# Patient Record
Sex: MALE | Race: WHITE | NOT HISPANIC OR LATINO | URBAN - METROPOLITAN AREA
[De-identification: names, ages, dates, MRNs, and addresses within clinical notes are randomized per-mention and may not be internally consistent; named-entity substitution may affect disease eponyms.]

---

## 2022-10-25 ENCOUNTER — TELEPHONE (OUTPATIENT)
Dept: INTERNAL MEDICINE | Facility: CLINIC | Age: 82
End: 2022-10-25
Payer: MEDICARE

## 2022-10-25 NOTE — TELEPHONE ENCOUNTER
----- Message from Rosey Rosales sent at 10/25/2022  4:37 PM CDT -----  Contact: [phone]  Patient is returning a phone call.  Who left a message for the patient: Gualberto  Does patient know what this is regarding:  APPT  Would you like a call back, or a response through your [facility] portal?:   CALL  Comments:

## 2022-11-15 ENCOUNTER — OFFICE VISIT (OUTPATIENT)
Dept: INTERNAL MEDICINE | Facility: CLINIC | Age: 82
End: 2022-11-15
Payer: MEDICARE

## 2022-11-15 VITALS
BODY MASS INDEX: 29.54 KG/M2 | WEIGHT: 211 LBS | HEART RATE: 53 BPM | SYSTOLIC BLOOD PRESSURE: 100 MMHG | OXYGEN SATURATION: 95 % | HEIGHT: 71 IN | DIASTOLIC BLOOD PRESSURE: 54 MMHG

## 2022-11-15 DIAGNOSIS — Z00.00 ROUTINE PHYSICAL EXAMINATION: ICD-10-CM

## 2022-11-15 DIAGNOSIS — R35.0 BENIGN PROSTATIC HYPERPLASIA WITH URINARY FREQUENCY: ICD-10-CM

## 2022-11-15 DIAGNOSIS — F32.A DEPRESSION, UNSPECIFIED DEPRESSION TYPE: ICD-10-CM

## 2022-11-15 DIAGNOSIS — R44.0 AUDITORY HALLUCINATIONS: ICD-10-CM

## 2022-11-15 DIAGNOSIS — N40.1 BENIGN PROSTATIC HYPERPLASIA WITH URINARY FREQUENCY: ICD-10-CM

## 2022-11-15 DIAGNOSIS — F41.1 GAD (GENERALIZED ANXIETY DISORDER): ICD-10-CM

## 2022-11-15 DIAGNOSIS — F29 PSYCHOSIS, UNSPECIFIED PSYCHOSIS TYPE: ICD-10-CM

## 2022-11-15 DIAGNOSIS — I10 PRIMARY HYPERTENSION: Primary | ICD-10-CM

## 2022-11-15 PROCEDURE — 99999 PR PBB SHADOW E&M-EST. PATIENT-LVL III: ICD-10-PCS | Mod: PBBFAC,,, | Performed by: INTERNAL MEDICINE

## 2022-11-15 PROCEDURE — 99204 PR OFFICE/OUTPT VISIT, NEW, LEVL IV, 45-59 MIN: ICD-10-PCS | Mod: S$PBB,,, | Performed by: INTERNAL MEDICINE

## 2022-11-15 PROCEDURE — 99999 PR PBB SHADOW E&M-EST. PATIENT-LVL III: CPT | Mod: PBBFAC,,, | Performed by: INTERNAL MEDICINE

## 2022-11-15 PROCEDURE — 99213 OFFICE O/P EST LOW 20 MIN: CPT | Mod: PBBFAC | Performed by: INTERNAL MEDICINE

## 2022-11-15 PROCEDURE — 99204 OFFICE O/P NEW MOD 45 MIN: CPT | Mod: S$PBB,,, | Performed by: INTERNAL MEDICINE

## 2022-11-15 RX ORDER — TAMSULOSIN HYDROCHLORIDE 0.4 MG/1
1 CAPSULE ORAL DAILY
COMMUNITY
Start: 2022-08-05 | End: 2023-02-07 | Stop reason: SDUPTHER

## 2022-11-15 RX ORDER — LOSARTAN POTASSIUM 25 MG/1
1 TABLET ORAL DAILY
COMMUNITY
Start: 2022-05-25 | End: 2023-11-14 | Stop reason: SDUPTHER

## 2022-11-15 RX ORDER — ACETAMINOPHEN, DEXTROMETHORPHAN HBR, DOXYLAMINE SUCCINATE, PHENYLEPHRINE HCL 650; 20; 12.5; 1 MG/30ML; MG/30ML; MG/30ML; MG/30ML
SOLUTION ORAL
COMMUNITY

## 2022-11-15 RX ORDER — MIRTAZAPINE 15 MG/1
30 TABLET, FILM COATED ORAL NIGHTLY
COMMUNITY
Start: 2022-08-31 | End: 2023-06-20 | Stop reason: SDUPTHER

## 2022-11-15 RX ORDER — EPINEPHRINE 0.3 MG/.3ML
INJECTION SUBCUTANEOUS
COMMUNITY
Start: 2022-06-15 | End: 2023-12-29 | Stop reason: SDUPTHER

## 2022-11-15 RX ORDER — METOPROLOL TARTRATE 50 MG/1
50 TABLET ORAL 2 TIMES DAILY
COMMUNITY
Start: 2022-08-10 | End: 2022-12-01 | Stop reason: SDUPTHER

## 2022-11-15 RX ORDER — ATORVASTATIN CALCIUM 20 MG/1
1 TABLET, FILM COATED ORAL DAILY
COMMUNITY
Start: 2022-01-17 | End: 2022-12-01 | Stop reason: SDUPTHER

## 2022-11-15 NOTE — PROGRESS NOTES
Subjective:       Patient ID: Yao Burroughs is a 82 y.o. male.    Chief Complaint: Establish Care    New pt to me to est care. Seen with his daughter. His son-in-law is a primary care provider. Moved from [place] a few weeks ago. He primarily needs to see Psychiatry for acute worsening earlier this year. He was hospitalized and saw several providers both in patient and outpatient. First he started with UTI and then had worsening anxiety and depression. He has heard noises that can turn in to auditory hallucinations. He was hearing neighbors talking about him. Was evaluated with brain scan and neuro and psych testing. [place] to possibly have mild MCI but not parkinsonism and not dementia . Many of the notes and tests from [place] are present in Care Everywhere. They want to establish with a psychiatrist and we will help. He gets very anxious about new things and does not want to start with an [facility] or Psychologist. I explained we can certainly work to get him plugged in.     He has stable HTN, HLP, BPH and med reviewed.   We may need another visit in coming weeks to further eval other issues but he wants to start with psychiatry first.   He is living in Assisted Living. He still drives locally and short distances but says he gets anxious.     Review of Systems   Respiratory:  Negative for cough and shortness of breath.    Cardiovascular:  Negative for chest pain.   Gastrointestinal:  Negative for abdominal pain.   Psychiatric/Behavioral:  Positive for agitation and dysphoric mood. The patient is nervous/anxious.          Past Medical History:   Diagnosis Date    Anxiety disorder, unspecified     Auditory hallucinations     Depression     Psychosis      No past surgical history on file.   Patient Active Problem List   Diagnosis    Primary hypertension    Benign prostatic hyperplasia with urinary frequency    JYOTI (generalized anxiety disorder)    Depression    Psychosis    Auditory hallucinations        Objective:      Physical  "Exam  Constitutional:       Appearance: Normal appearance.   Cardiovascular:      Rate and Rhythm: Normal rate and regular rhythm.      Pulses: Normal pulses.      Heart sounds: Normal heart sounds.   Pulmonary:      Effort: No respiratory distress.   Chest:      Chest wall: No tenderness.   Abdominal:      Tenderness: There is no abdominal tenderness.   Neurological:      General: No focal deficit present.      Mental Status: He is alert and oriented to person, place, and time.   Psychiatric:         Mood and Affect: Mood normal.       Assessment:       Problem List Items Addressed This Visit          Psychiatric    JYOTI (generalized anxiety disorder)    Relevant Orders    Ambulatory referral/consult to Psychiatry    Depression    Relevant Orders    Ambulatory referral/consult to Psychiatry    Psychosis    Relevant Orders    Ambulatory referral/consult to Psychiatry       Cardiac/Vascular    Primary hypertension - Primary       Renal/    Benign prostatic hyperplasia with urinary frequency       Other    Auditory hallucinations     Other Visit Diagnoses       Routine physical examination                Plan:         Yao was seen today for establish care.    Diagnoses and all orders for this visit:    Primary hypertension    JYOTI (generalized anxiety disorder)  -     Ambulatory referral/consult to Psychiatry; Future    Depression, unspecified depression type  -     Ambulatory referral/consult to Psychiatry; Future    Psychosis, unspecified psychosis type  -     Ambulatory referral/consult to Psychiatry; Future    Auditory hallucinations    Benign prostatic hyperplasia with urinary frequency    Routine physical examination         Records reviewed regarding Neuro notes and MRI scan. I intend to review more as time permits.           Portions of this note may have been created with voice recognition software. Occasional "wrong-word" or "sound-a-like" substitutions may have occurred due to the inherent limitations of " voice recognition software. Please, read the note carefully and recognize, using context, where substitutions have occurred.

## 2022-11-19 ENCOUNTER — PATIENT MESSAGE (OUTPATIENT)
Dept: INTERNAL MEDICINE | Facility: CLINIC | Age: 82
End: 2022-11-19
Payer: MEDICARE

## 2022-11-21 NOTE — TELEPHONE ENCOUNTER
Please let pt or his daughter know we are trying to help. Usually Psychiatry requires pt/fam to schedule. He just moved here to be near his family. Son -in-law is an old friend of mine, physician here in town. Not sure if we can reach the Psych deprt they can help but we can try. He had bad experience with NP in psych in [place] so that is why he is trying to get right in to a physician.

## 2022-11-22 NOTE — TELEPHONE ENCOUNTER
Patient has apt scheduled. Called patient to ensure he was aware. He reports his daughter made the apt 15 minutes ago. No additional concerns or questions. Pt appreciative of phone call.

## 2022-11-28 ENCOUNTER — TELEPHONE (OUTPATIENT)
Dept: INTERNAL MEDICINE | Facility: CLINIC | Age: 82
End: 2022-11-28
Payer: MEDICARE

## 2022-11-28 NOTE — TELEPHONE ENCOUNTER
----- Message from Nel Padilla sent at 11/28/2022 12:59 PM CST -----  Contact: pt [phone]  Requesting an RX refill or new RX.  Is this a refill or new RX: Refill  RX name and strength: metoprolol tartrate (LOPRESSOR) 50 MG tablet  Is this a 30 day or 90 day RX: 90 day with refills  Patient advised refills can take 72 hours and [facility] can be used for refills?:  yes  Pharmacy name and phone #:   [id number] - [place], [place] - [address] AT [facility] [address]  [facility]  [facility] [address]  Phone: [phone] Fax: [phone]    Comments:     Thank You

## 2022-11-30 ENCOUNTER — PATIENT MESSAGE (OUTPATIENT)
Dept: INTERNAL MEDICINE | Facility: CLINIC | Age: 82
End: 2022-11-30
Payer: MEDICARE

## 2022-11-30 DIAGNOSIS — E78.5 HYPERLIPIDEMIA, UNSPECIFIED HYPERLIPIDEMIA TYPE: Primary | ICD-10-CM

## 2022-12-01 RX ORDER — METOPROLOL TARTRATE 50 MG/1
50 TABLET ORAL 2 TIMES DAILY
Qty: 60 TABLET | Refills: 0 | Status: SHIPPED | OUTPATIENT
Start: 2022-12-01 | End: 2022-12-02

## 2022-12-01 NOTE — TELEPHONE ENCOUNTER
Refill Routing Note   Medication(s) are not appropriate for processing by [facility] for the following reason(s):      - Required laboratory values are outdated    [facility] action(s):  Defer Medication-related problems identified: Requires labs     Medication Therapy Plan: Labs (CMP, Lipid panel) outdated  Medication reconciliation completed: No     Appointments  past 12m or future 3m with PCP    Date Provider   Last Visit   11/15/2022 Randall Aleman MD   Next Visit   12/1/2022 Randall Aleman MD   ED visits in past 90 days: 0        Note composed:3:20 PM 12/01/2022

## 2022-12-01 NOTE — TELEPHONE ENCOUNTER
No new care gaps identified.  [facility] Embedded Care Gaps. Reference number: [id number]. 12/01/2022   9:54:11 AM CST

## 2022-12-01 NOTE — TELEPHONE ENCOUNTER
No new care gaps identified.  [facility] Embedded Care Gaps. Reference number: [id number]. 12/01/2022   8:29:27 AM CST

## 2022-12-01 NOTE — TELEPHONE ENCOUNTER
----- Message from Sulaiman Deshpande sent at 12/1/2022  9:36 AM CST -----  Contact: [phone]  Requesting an RX refill or new RX.  Is this a refill or new RX: refill  RX name and strength (copy/paste from chart):  metoprolol tartrate (LOPRESSOR) 50 MG tablet  Is this a 30 day or 90 day RX:   Pharmacy name and phone # (copy/paste from chart):    [facility][id number] - [place], [place] - [address] [facility] [address]  [address]  [facility] [address]  Phone: [phone] Fax: [phone]  The doctors have asked that we provide their patients with the following 2 reminders -- prescription refills can take up to 72 hours, and a friendly reminder that in the future you can use your [facility] account to request refills: Call back     Pt said he has been calling about this and still has not got his med. Please call pt back about this.

## 2022-12-02 RX ORDER — ATORVASTATIN CALCIUM 20 MG/1
20 TABLET, FILM COATED ORAL DAILY
Qty: 30 TABLET | Refills: 1 | Status: SHIPPED | OUTPATIENT
Start: 2022-12-02 | End: 2023-10-03 | Stop reason: SDUPTHER

## 2022-12-02 NOTE — TELEPHONE ENCOUNTER
Provider Staff:     Action is required for this patient.   Please see care gap opportunities below in Care Due Message.     Thanks!  [facility]     Appointments      Date Provider   Last Visit   11/15/2022 Randall Aleman MD   Next Visit   12/1/2022 Randall Aleman MD     Note composed:1:18 PM 12/02/2022

## 2023-02-07 DIAGNOSIS — N40.1 BENIGN PROSTATIC HYPERPLASIA WITH URINARY FREQUENCY: Primary | ICD-10-CM

## 2023-02-07 DIAGNOSIS — R35.0 BENIGN PROSTATIC HYPERPLASIA WITH URINARY FREQUENCY: Primary | ICD-10-CM

## 2023-02-07 RX ORDER — TAMSULOSIN HYDROCHLORIDE 0.4 MG/1
1 CAPSULE ORAL DAILY
Qty: 90 CAPSULE | Refills: 1 | Status: SHIPPED | OUTPATIENT
Start: 2023-02-07 | End: 2023-07-31

## 2023-02-07 NOTE — TELEPHONE ENCOUNTER
Refill Routing Note   Medication(s) are not appropriate for processing by [facility] for the following reason(s):         Other  No active prescription written by PCP  Historical Med    [facility] action(s):  Defer  Care gaps identified: Yes           Medication Therapy Plan: Hsitorical med, modified on 11/15/22    Appointments  past 12m or future 3m with PCP    Date Provider   Last Visit   11/15/2022 Randall Aleman MD   Next Visit   Visit date not found Randall Aleman MD   ED visits in past 90 days: 0        Note composed:12:34 PM 02/07/2023

## 2023-02-07 NOTE — TELEPHONE ENCOUNTER
Care Due:                  Date            Visit Type   Department     Provider  --------------------------------------------------------------------------------                                EP -                              PRIMARY      [facility]  Last Visit: 11-      CARE ([facility])   MEDICINE       Randall Aleman  Next Visit: None Scheduled  None         None Found                                                            Last  Test          Frequency    Reason                     Performed    Due Date  --------------------------------------------------------------------------------    CMP.........  12 months..  atorvastatin.............  Not Found    Overdue    Lipid Panel.  12 months..  atorvastatin.............  Not Found    Overdue    [employer] Embedded Care Gaps. Reference number: [id number]. 2/07/2023   10:37:52 AM CST

## 2023-02-07 NOTE — TELEPHONE ENCOUNTER
----- Message from Priyanka Chen sent at 2/7/2023  9:44 AM CST -----  Contact: [phone]  Requesting an RX refill or new RX.  Is this a refill or new RX: refill  RX name and strength (copy/paste from chart):  tamsulosin (FLOMAX) 0.4 mg Cap  Is this a 30 day or 90 day RX: 90 day   Pharmacy name and phone # (copy/paste from chart):   [email or web address][id number] - [place], [place] - [facility] [facility] [address]  [address]  [facility] [address]  Phone: [phone] Fax: [phone]   The doctors have asked that we provide their patients with the following 2 reminders -- prescription refills can take up to 72 hours, and a friendly reminder that in the future you can use your [facility] account to request refills: aware

## 2023-03-20 ENCOUNTER — LAB VISIT (OUTPATIENT)
Dept: LAB | Facility: HOSPITAL | Age: 83
End: 2023-03-20
Payer: MEDICARE

## 2023-03-20 ENCOUNTER — OFFICE VISIT (OUTPATIENT)
Dept: PSYCHIATRY | Facility: CLINIC | Age: 83
End: 2023-03-20
Payer: MEDICARE

## 2023-03-20 VITALS
HEART RATE: 61 BPM | SYSTOLIC BLOOD PRESSURE: 132 MMHG | BODY MASS INDEX: 30.15 KG/M2 | DIASTOLIC BLOOD PRESSURE: 88 MMHG | WEIGHT: 216.19 LBS

## 2023-03-20 DIAGNOSIS — R45.84 ANHEDONIA: ICD-10-CM

## 2023-03-20 DIAGNOSIS — F33.2 SEVERE EPISODE OF RECURRENT MAJOR DEPRESSIVE DISORDER, WITHOUT PSYCHOTIC FEATURES: ICD-10-CM

## 2023-03-20 DIAGNOSIS — F29 PSYCHOSIS, UNSPECIFIED PSYCHOSIS TYPE: ICD-10-CM

## 2023-03-20 DIAGNOSIS — F41.1 GAD (GENERALIZED ANXIETY DISORDER): ICD-10-CM

## 2023-03-20 DIAGNOSIS — F32.A DEPRESSION, UNSPECIFIED DEPRESSION TYPE: ICD-10-CM

## 2023-03-20 DIAGNOSIS — F33.2 SEVERE EPISODE OF RECURRENT MAJOR DEPRESSIVE DISORDER, WITHOUT PSYCHOTIC FEATURES: Primary | ICD-10-CM

## 2023-03-20 LAB
ALBUMIN SERPL BCP-MCNC: 3.8 G/DL (ref 3.5–5.2)
ALP SERPL-CCNC: 69 U/L (ref 55–135)
ALT SERPL W/O P-5'-P-CCNC: 17 U/L (ref 10–44)
ANION GAP SERPL CALC-SCNC: 10 MMOL/L (ref 8–16)
AST SERPL-CCNC: 25 U/L (ref 10–40)
BASOPHILS # BLD AUTO: 0.03 K/UL (ref 0–0.2)
BASOPHILS NFR BLD: 0.5 % (ref 0–1.9)
BILIRUB SERPL-MCNC: 1 MG/DL (ref 0.1–1)
BUN SERPL-MCNC: 13 MG/DL (ref 8–23)
CALCIUM SERPL-MCNC: 9.5 MG/DL (ref 8.7–10.5)
CHLORIDE SERPL-SCNC: 106 MMOL/L (ref 95–110)
CO2 SERPL-SCNC: 25 MMOL/L (ref 23–29)
CREAT SERPL-MCNC: 1 MG/DL (ref 0.5–1.4)
DIFFERENTIAL METHOD: ABNORMAL
EOSINOPHIL # BLD AUTO: 0.2 K/UL (ref 0–0.5)
EOSINOPHIL NFR BLD: 2.4 % (ref 0–8)
ERYTHROCYTE [DISTWIDTH] IN BLOOD BY AUTOMATED COUNT: 13 % (ref 11.5–14.5)
EST. GFR  (NO RACE VARIABLE): >60 ML/MIN/1.73 M^2
FOLATE SERPL-MCNC: 11.5 NG/ML (ref 4–24)
GLUCOSE SERPL-MCNC: 100 MG/DL (ref 70–110)
HCT VFR BLD AUTO: 46.3 % (ref 40–54)
HGB BLD-MCNC: 15.2 G/DL (ref 14–18)
IMM GRANULOCYTES # BLD AUTO: 0.03 K/UL (ref 0–0.04)
IMM GRANULOCYTES NFR BLD AUTO: 0.5 % (ref 0–0.5)
LYMPHOCYTES # BLD AUTO: 2.2 K/UL (ref 1–4.8)
LYMPHOCYTES NFR BLD: 33.1 % (ref 18–48)
MCH RBC QN AUTO: 29.7 PG (ref 27–31)
MCHC RBC AUTO-ENTMCNC: 32.8 G/DL (ref 32–36)
MCV RBC AUTO: 90 FL (ref 82–98)
MONOCYTES # BLD AUTO: 0.8 K/UL (ref 0.3–1)
MONOCYTES NFR BLD: 11.3 % (ref 4–15)
NEUTROPHILS # BLD AUTO: 3.5 K/UL (ref 1.8–7.7)
NEUTROPHILS NFR BLD: 52.2 % (ref 38–73)
NRBC BLD-RTO: 0 /100 WBC
PLATELET # BLD AUTO: 130 K/UL (ref 150–450)
PMV BLD AUTO: 10.3 FL (ref 9.2–12.9)
POTASSIUM SERPL-SCNC: 5.2 MMOL/L (ref 3.5–5.1)
PROT SERPL-MCNC: 7.1 G/DL (ref 6–8.4)
RBC # BLD AUTO: 5.12 M/UL (ref 4.6–6.2)
SODIUM SERPL-SCNC: 141 MMOL/L (ref 136–145)
T3FREE SERPL-MCNC: 3 PG/ML (ref 2.3–4.2)
T4 FREE SERPL-MCNC: 0.82 NG/DL (ref 0.71–1.51)
TSH SERPL DL<=0.005 MIU/L-ACNC: 1.97 UIU/ML (ref 0.4–4)
VIT B12 SERPL-MCNC: 683 PG/ML (ref 210–950)
WBC # BLD AUTO: 6.65 K/UL (ref 3.9–12.7)

## 2023-03-20 PROCEDURE — 82746 ASSAY OF FOLIC ACID SERUM: CPT | Performed by: STUDENT IN AN ORGANIZED HEALTH CARE EDUCATION/TRAINING PROGRAM

## 2023-03-20 PROCEDURE — 99999 PR PBB SHADOW E&M-EST. PATIENT-LVL II: ICD-10-PCS | Mod: PBBFAC,,,

## 2023-03-20 PROCEDURE — 99214 OFFICE O/P EST MOD 30 MIN: CPT | Mod: S$PBB,,, | Performed by: PSYCHIATRY & NEUROLOGY

## 2023-03-20 PROCEDURE — 85025 COMPLETE CBC W/AUTO DIFF WBC: CPT | Performed by: STUDENT IN AN ORGANIZED HEALTH CARE EDUCATION/TRAINING PROGRAM

## 2023-03-20 PROCEDURE — 84443 ASSAY THYROID STIM HORMONE: CPT | Performed by: STUDENT IN AN ORGANIZED HEALTH CARE EDUCATION/TRAINING PROGRAM

## 2023-03-20 PROCEDURE — 82607 VITAMIN B-12: CPT | Performed by: STUDENT IN AN ORGANIZED HEALTH CARE EDUCATION/TRAINING PROGRAM

## 2023-03-20 PROCEDURE — 99999 PR PBB SHADOW E&M-EST. PATIENT-LVL II: CPT | Mod: PBBFAC,,,

## 2023-03-20 PROCEDURE — 36415 COLL VENOUS BLD VENIPUNCTURE: CPT | Performed by: STUDENT IN AN ORGANIZED HEALTH CARE EDUCATION/TRAINING PROGRAM

## 2023-03-20 PROCEDURE — 99212 OFFICE O/P EST SF 10 MIN: CPT | Mod: PBBFAC

## 2023-03-20 PROCEDURE — 84439 ASSAY OF FREE THYROXINE: CPT | Performed by: STUDENT IN AN ORGANIZED HEALTH CARE EDUCATION/TRAINING PROGRAM

## 2023-03-20 PROCEDURE — 99214 PR OFFICE/OUTPT VISIT, EST, LEVL IV, 30-39 MIN: ICD-10-PCS | Mod: S$PBB,,, | Performed by: PSYCHIATRY & NEUROLOGY

## 2023-03-20 PROCEDURE — 80053 COMPREHEN METABOLIC PANEL: CPT | Performed by: STUDENT IN AN ORGANIZED HEALTH CARE EDUCATION/TRAINING PROGRAM

## 2023-03-20 PROCEDURE — 84481 FREE ASSAY (FT-3): CPT | Performed by: STUDENT IN AN ORGANIZED HEALTH CARE EDUCATION/TRAINING PROGRAM

## 2023-03-20 RX ORDER — FLUOXETINE 10 MG/1
10 TABLET ORAL DAILY
Qty: 30 TABLET | Refills: 1 | Status: SHIPPED | OUTPATIENT
Start: 2023-03-20 | End: 2023-04-25 | Stop reason: SDUPTHER

## 2023-03-20 NOTE — PROGRESS NOTES
"OUTPATIENT ANEL-PSYCHIATRY INITIAL VISIT    ENCOUNTER DATE:  3/20/2023  SITE:  [facility]  REFFERAL SOURCE:  Randall Aleman MD  LENGTH OF SESSION:  70 minutes    CHIEF COMPLAINT:   "I haven't seen a psychiatrist since I was discharged."    HISTORY OF PRESENTING ILLNESS:  Yao Burroughs is a 82 y.o. male with history of JYOTI, MDD w/ psychotic features who presents for initial assessment.    History as told by Patient - & daughter    Moved to [facility] in October 2022. Living in an assisted living at [facility]. He was admitted to [facility] for 2 weeks for hearing voices. The impression was that he had a psychotic episode. After hospitalization he was told he'd be on risperdal for the rest of his life, which he did not like. He followed up with psychiatric nurse practitioners who switched his risperdal to abilify and then to seroquel and then discontinued. He is not currently on any antipsychotics. He's been seeing a psychologist since 2011 when his parents passed, but that relationship also ended in 2022. He's been on a number of antidepressants.    Recounts that in Feb 2022 he was "hearing" neighbors talking about him, but much of the time he realized that they weren't actually his neighbors. He has very sensitive hearing and thinks it was just his brain interpreting other noises largely due to isolation. There were times he was worried and scared because the voices were very threatening and generally antagonistic. Much of this was synced up with severe isolation and a severe depressive episode. He states he has been depressed all of his life and is still currently depressed as well.    Sleeping much better lately. Able to get to sleep, if he wakes up to go to the bathroom he falls back asleep, and he feels rested when he wakes up. Sometimes over-focuses on sounds or sensations which keep him awake for a little while sometimes. Appetite is "good." Has a hard " "time answering how his mood is because "I'm just starting to feel grounded in my new place." Most of the time he's pretty calm. His daughter mentions he tends towards worry and that he tends to over-think things, which he agrees with. "It's more unhelpful thoughts than helpful." Worries things are going to go wrong - lots of what-ifs. Lots of rumination on the past, regrets, questioning how things could have been different. Much of his thinking is about things outside of his control. He does have some grounding/distracting strategies that he is using like going for walks. Sees a elda-psychologist for counseling bi-weekly and talks to a  once a month.    He doesn't necessarily like it there. The activities aren't necessarily things that attract him. He is Faith and practices his jewel. His daughter states he looks so much better since being here. When he first moved here at the end of October he wasn't even able to concentrate long enough to read. The transition leaving his house of 30 years was difficult, he'd undergone a divorce 5 years earlier. He's keeping a routine with their 3 meals a day. He doesn't have a TV, but he's never been a TV person. He takes walks and reads. He does 20 minutes on an exercise bike at least once a week. Talks to the nuns at his facility and attends mass. Sometimes he naps. He does have a car, but doesn't really feel comfortable driving.     Denies current or history of SI. No current AVH. Not actively RIS.      PSYCHIATRIC REVIEW OF SYMPTOMS: (Is patient experiencing or having changes in any of the following? Positives bolded.)    Symptoms of Depression: diminished mood or loss of interest/anhedonia; irritability, diminished energy, change in sleep, change in appetite, diminished concentration or cognition or indecisiveness, PMA/R, excessive guilt or hopelessness or worthlessness, suicidal ideations, Self injurious behaviors    Symptoms of JYOTI: excessive anxiety/worry/fear, " "more days than not, about numerous issues, difficult to control, with restlessness, fatigue, poor concentration, irritability, muscle tension, sleep disturbance; causes functionally impairing distress     Symptoms of Panic Attacks: Denies recurrent panic attacks- Description- SOB/ palpitations/ tremulousness, numbness/ paraesthesias/ nausea/ feeling of impending doom/ derealization/ depersonalization     Symptoms of flynn or hypomania: Denies - "I've never had that type of euphoria." Daughter agrees. elevated, expansive, or irritable mood with increased energy or activity; with inflated self-esteem or grandiosity, decreased need for sleep, increased rate of speech,  racing thoughts, distractibility, increased goal directed activity or PMA, risky/disinhibited behavior    Symptoms of psychosis: "There's been nothing that approaches the conversations, sometimes something that sounds like an [place] radio, but no works no conversation." Not since leaving the hospital - hallucinations, delusions, disorganized thinking, disorganized behavior or abnormal motor behavior, or negative symptoms (diminshed emotional expression, avolition, anhedonia, alogia, asociality)     Symptoms of PTSD: h/o trauma: Denies re-experiencing/intrusive symptoms, nightmares, avoidant behavior, negative alterations in cognition or mood, or hyperarousal symptoms; with or without dissociative symptoms     Sleep: initiation/maintenance/early morning awakening with inability to return to sleep/hypnagogic or hypnaompic hallucinations    Symptoms of OCD: obsessions, compulsions or ruminations    Symptoms of Eating Disorders: anorexia, bulimia or binge eating    Symptoms of ADHD: inattention or hyperactivity    Risk Parameters:  Patient reports no suicidal ideation  Patient reports no homicidal ideation  Patient reports no self-injurious behavior  Patient reports no violent behavior    PSYCHIATRIC MED REVIEW    Current psych meds  Medication side effects:  " "Denies  Medication compliance:  Yes    Previous psych meds trials    PAST PSYCHIATRIC HISTORY:  Previous Psychiatric Diagnoses:  JYOTI, MDD w/ psychotic features  Previous Psychiatric Hospitalizations:  Just the one in February '22  Previous SI/HI: No  Previous Suicide Attempts:  No  Previous Self injurious behaviors: No  Previous Medication Trials:  Yes - Abilify, Risperdal, Seroquel, Zoloft, Mirtazapine  Psychiatric Care (current & past):  Yes  History of Psychotherapy:  Yes - currently  History of Violence: No    SUBSTANCE ABUSE HISTORY:  Caffeine: Yes - 1 cup at breakfast.  Tobacco:  No  Alcohol: A beer once every other week  Illicit Substances:  No  Misuse of Prescription Medications: No  Other: Herbal supplements/ online supplements: No    FAMILY HISTORY:  Psychiatric: Youngest brother lifelong "mood disorder or something" he's been hospitalized  Family H/o suicide:  No    SOCIAL HISTORY:  Developmental/Childhood:Achieved all developmental milestones timely  Education: College graduate  Employment Status/Finances:Retired   Relationship Status/Sexual Orientation: : 5 years ago  Children: 3  Housing Status: Assisted Living    history:  NO  Access to Firearms: NO;  Locked up?  Jain:Actively participates in organized Jain  Recreational activities: Reading    LEGAL HISTORY:   Past charges/incarcerations: No  Pending charges:No    NEUROLOGIC HISTORY:  Seizures:  No   Head trauma:  No    MEDICAL REVIEW OF SYSTEMS:  Complete review of systems performed covering Constitutional, Cardiovascular, Respiratory, Gastrointestinal, Musculoskeletal, Skin, Neurologic and Endocrine  All systems negative    MEDICAL HISTORY:  Past Medical History:   Diagnosis Date    Anxiety disorder, unspecified     Auditory hallucinations     Depression     Psychosis      No past surgical history on file.    ALL MEDICATIONS:    Current Outpatient Medications:     atorvastatin (LIPITOR) 20 MG tablet, Take 1 tablet (20 mg " "total) by mouth once daily., Disp: 30 tablet, Rfl: 1    cholecalciferol, vitamin D3, (D3-2000 ORAL), Take by mouth., Disp: , Rfl:     cyanocobalamin, vitamin B-12, 1,000 mcg TbSR, Take by mouth., Disp: , Rfl:     EPINEPHrine (EPIPEN) 0.3 mg/0.3 mL AtIn, USE AS DIRECTED FOR ALLERGIC REACTIONS, Disp: , Rfl:     losartan (COZAAR) 25 MG tablet, Take 1 tablet by mouth once daily., Disp: , Rfl:     metoprolol tartrate (LOPRESSOR) 50 MG tablet, TAKE 1 TABLET(50 MG) BY MOUTH TWICE DAILY, Disp: 180 tablet, Rfl: 1    mirtazapine (REMERON) 15 MG tablet, Take 30 mg by mouth every evening., Disp: , Rfl:     tamsulosin (FLOMAX) 0.4 mg Cap, Take 1 capsule (0.4 mg total) by mouth once daily., Disp: 90 capsule, Rfl: 1    ALLERGIES:  Review of patient's allergies indicates:   Allergen Reactions    Bee sting [allergen ext-venom-honey bee]        RELEVANT LABS/STUDIES:    No results found for: WBC, HGB, HCT, MCV, PLT  BMP  No results found for: NA, K, CL, CO2, BUN, CREATININE, CALCIUM, ANIONGAP, ESTGFRAFRICA, EGFRNONAA  No results found for: ALT, AST, GGT, ALKPHOS, BILITOT  No results found for: TSH  No results found for: LABA1C, HGBA1C      VITALS  Vitals:    03/20/23 0953   BP: 132/88   Pulse: 61   Weight: 98.1 kg (216 lb 2.6 oz)       PHYSICAL EXAM  General: well developed, well nourished  Neurologic:   Gait: Normal   Psychomotor signs:  No involuntary movements or tremor  AIMS: none    PSYCHIATRIC EXAM:    Mental Status Exam:  Appearance: unremarkable, age appropriate  Behavior/Cooperation: appropriate, friendly and cooperative  Speech:  normal tone, normal rate, normal pitch, normal volume  Language: uses words appropriately; NO aphasia or dysarthria  Mood: "okay"  Affect:  Full, Appropriate  Thought Process: normal and logical  Thought Content: normal, no suicidality, no homicidality, delusions, or paranoia  Level of Consciousness: Alert and Oriented x3  Memory:  Recent:  Intact; able to report recent events   Remote:  Intact; " able to recount childhood events  Attention/concentration: appropriate for age/education.   Fund of Knowledge: appears adequate  Insight: Intact  Judgment: Intact     IMPRESSION:    Yao Burroughs is a 82 y.o. male with history of JYOTI, MDD w/ psychotic features who presents for initial assessment. History of first (only) psychotic episode in February 2022 after a long period of isolation and worsening depression which resulted in psychiatric hospitalization. History of depression most of his adult life. Anxiety has been worse lately, rumination and anticipatory. Amenable to trial of SSRI and continuing mirtazapine.    DIAGNOSES:    ICD-10-CM ICD-9-CM   1. JYOTI (generalized anxiety disorder)  F41.1 300.02   2. Depression, unspecified depression type  F32.A 311   3. Psychosis, unspecified psychosis type  F29 298.9       PLAN:  Psych Med:  Continue Mirtazapine 30mg qHS for depression, insomnia, appetite  Start Fluoxetine 10mg qDaily for depression      Discussed with patient potential side effects of SSRI's including but not limited to sexual dysfunction, GI side effects, headaches, dizziness possible weight gain, sleep effects and serotonin syndrome with described symptoms and to present to ER should they arise. Patient voiced understanding. Educated patient on importance of daily adherence, and that SSRIs can take up to 4-6 weeks for full effect. Patient voiced understanding.    Discussed with patient informed consent, risks vs. benefits, alternative treatments, side effect profile and the inherent unpredictability of individual responses to these treatments. Answered any questions patient may have had. The patient expresses understanding of the above and displays the capacity to agree with this current plan     Other: Follow-up Thyroid, B12, CBC, CMP    RETURN TO CLINIC:  RTC 6 weeks    Mauro Yañez MD  [facility] PGY-4  3/20/2023 10:02 AM

## 2023-04-25 ENCOUNTER — TELEPHONE (OUTPATIENT)
Dept: PSYCHIATRY | Facility: CLINIC | Age: 83
End: 2023-04-25
Payer: MEDICARE

## 2023-04-25 RX ORDER — FLUOXETINE 10 MG/1
10 TABLET ORAL DAILY
Qty: 30 TABLET | Refills: 1 | Status: SHIPPED | OUTPATIENT
Start: 2023-04-25 | End: 2023-06-20

## 2023-04-25 NOTE — TELEPHONE ENCOUNTER
----- Message from Marcella Stubbs sent at 4/25/2023  9:19 AM CDT -----  Regarding: Refill  Pt is requesting refill for FLUoxetine 10 MG Tab from [facility] #[id number] - [place] [address] AT [facility] [address]   Phone: [phone], Fax: [phone].  Pt last seen on 3/20/23 and scheduled to be seen on 6/20/23.  Pt says since he will not be seen until the end of June, he is requesting you send in more than one refill.    He can be reached at [phone].    Thank you.

## 2023-06-20 ENCOUNTER — CLINICAL SUPPORT (OUTPATIENT)
Dept: PSYCHIATRY | Facility: CLINIC | Age: 83
End: 2023-06-20
Payer: MEDICARE

## 2023-06-20 VITALS
SYSTOLIC BLOOD PRESSURE: 141 MMHG | WEIGHT: 211.56 LBS | DIASTOLIC BLOOD PRESSURE: 70 MMHG | BODY MASS INDEX: 29.5 KG/M2 | HEART RATE: 56 BPM

## 2023-06-20 DIAGNOSIS — F41.1 GAD (GENERALIZED ANXIETY DISORDER): Primary | ICD-10-CM

## 2023-06-20 DIAGNOSIS — F29 PSYCHOSIS, UNSPECIFIED PSYCHOSIS TYPE: ICD-10-CM

## 2023-06-20 DIAGNOSIS — F32.A DEPRESSION, UNSPECIFIED DEPRESSION TYPE: ICD-10-CM

## 2023-06-20 PROCEDURE — 99212 OFFICE O/P EST SF 10 MIN: CPT | Mod: PBBFAC

## 2023-06-20 PROCEDURE — 99999 PR PBB SHADOW E&M-EST. PATIENT-LVL II: ICD-10-PCS | Mod: PBBFAC,,,

## 2023-06-20 PROCEDURE — 99214 OFFICE O/P EST MOD 30 MIN: CPT | Mod: S$PBB,,, | Performed by: PSYCHIATRY & NEUROLOGY

## 2023-06-20 PROCEDURE — 99214 PR OFFICE/OUTPT VISIT, EST, LEVL IV, 30-39 MIN: ICD-10-PCS | Mod: S$PBB,,, | Performed by: PSYCHIATRY & NEUROLOGY

## 2023-06-20 PROCEDURE — 99999 PR PBB SHADOW E&M-EST. PATIENT-LVL II: CPT | Mod: PBBFAC,,,

## 2023-06-20 RX ORDER — FLUOXETINE HYDROCHLORIDE 20 MG/1
20 CAPSULE ORAL DAILY
Qty: 90 CAPSULE | Refills: 1 | Status: SHIPPED | OUTPATIENT
Start: 2023-06-20 | End: 2023-10-18 | Stop reason: SDUPTHER

## 2023-06-20 RX ORDER — MIRTAZAPINE 30 MG/1
30 TABLET, FILM COATED ORAL NIGHTLY
Qty: 90 TABLET | Refills: 1 | Status: SHIPPED | OUTPATIENT
Start: 2023-06-20 | End: 2023-10-18 | Stop reason: SDUPTHER

## 2023-06-20 NOTE — PROGRESS NOTES
"OUTPATIENT PSYCHIATRY FOLLOW UP VISIT    ENCOUNTER DATE:  6/20/2023  SITE:  [facility], [facility]  LENGTH OF SESSION:  60 minutes      CHIEF COMPLAINT:  No chief complaint on file.      HISTORY OF PRESENTING ILLNESS:  Yao Burroughs is a 83 y.o. male with history of JYOTI, depression, unspecified psychosis who presents for follow up appointment.      Plan at last appointment on 3/20/23  Continue Mirtazapine 30mg qHS for depression, insomnia, appetite  Start Fluoxetine 10mg qDaily for depression      Interval history as told by Patient - & or family/friend/spouse/caregiver with pts permission, daughter present    Mr Burroughs is here with his daughter Allison.  They say that his anxiety is worsening and he got anxious over the little things, like how he got anxious about going to a bookshop later even though he enjoys reading. They're frustrated but understanding that they are seeing different doctors here, said they'd rather see the same provider more frequently.  Regarding his depression, he said he feels that he's in a "well established in some plateau". He expressed some hopelessness about not getting better, lack of motivation, and self criticism. Denied active or passive SI.   He said he's no longer hearing voices, but sometimes he hears noises that turn into mumbling from people or the radio. He'd look for the noises and sometimes doesn't find any. He understands that the noises are not real and that he can block the noises out and then they go away after he puts his hands over his ears. When asked to rate how much the noises bother him on a scale of 1-10, he said -2.  Regarding his medications, he said he hasn't noticed any improvements on prozac, but said he's not sure if it's ineffective or if he's still adjusting to his environment (recently moved to assisted living). Denied any side effects. Said he's been taking remeron for a while and hasn't noticed any changes or side effects either. " "Agreeable to increasing prozac today.   He and daughter are concerned that he is not engaging with his community, said dthat he mostly eats, reads, and takes walks during the day and does not enjoy many activities offered at assisted living. Encouraged pt to look for other community activities at libraries, churches, or possibly take a course at community college. Pt was somewhat agreeable.      PSYCHIATRIC REVIEW OF SYSTEMS:(none/ yes- better/worse/stable/& what symptoms)    Symptoms of Depression: "well established in some plateau", some thoughts of hopelessness about mental health, lack of motivation, negative thoughts about self, denied anhedonia, denied SI    Symptoms of Anxiety/ panic attacks: persistent anxiety, difficulty controlling worrying, worrying about various things, irritability, denied panic attacks; worsening    Symptoms of Patty/Hypomania: denied     Symptoms of psychosis: denied AVH, sometimes hears noises that he thinks turn into voices or something, but recognizes that they're not real and can make them stop    Sleep: has some trouble falling asleep, mostly sleeps through the night, feels well rested in the morning but sometimes sleeps an extra hour after breakfast    Appetite: good, eats 3 meals a day, however not enjoying food as much    Other:denied tobacco    Alcohol: very rarely, on Sunday nights sometimes    Illicit Drugs: denied    Psychosocial stressors: mental health, previous divorce and effects on children    Risk Parameters:  Patient reports no suicidal ideation  Patient reports no homicidal ideation  Patient reports no self-injurious behavior  Patient reports no violent behavior    PSYCHIATRIC MED REVIEW    Current psych meds  Medication side effects:  denied  Medication compliance:  yes    Previous psych meds trials  Prozac, remeron    PAST PSYCHIATRIC, MEDICAL, AND SOCIAL HISTORY REVIEWED  The patient's past medical, family and social history have been reviewed and updated as " appropriate within the electronic medical record - see encounter notes.    MEDICAL REVIEW OF SYSTEMS:  Complete review of systems performed covering Constitutional, Musculoskeletal, Neurologic.  All systems negative/ except none    ALL MEDICATIONS:    Current Outpatient Medications:     atorvastatin (LIPITOR) 20 MG tablet, Take 1 tablet (20 mg total) by mouth once daily., Disp: 30 tablet, Rfl: 1    cholecalciferol, vitamin D3, (D3-2000 ORAL), Take by mouth., Disp: , Rfl:     cyanocobalamin, vitamin B-12, 1,000 mcg TbSR, Take by mouth., Disp: , Rfl:     EPINEPHrine (EPIPEN) 0.3 mg/0.3 mL AtIn, USE AS DIRECTED FOR ALLERGIC REACTIONS, Disp: , Rfl:     losartan (COZAAR) 25 MG tablet, Take 1 tablet by mouth once daily., Disp: , Rfl:     metoprolol tartrate (LOPRESSOR) 50 MG tablet, TAKE 1 TABLET(50 MG) BY MOUTH TWICE DAILY, Disp: 180 tablet, Rfl: 1    mirtazapine (REMERON) 15 MG tablet, Take 30 mg by mouth every evening., Disp: , Rfl:     tamsulosin (FLOMAX) 0.4 mg Cap, Take 1 capsule (0.4 mg total) by mouth once daily., Disp: 90 capsule, Rfl: 1    ALLERGIES:  Review of patient's allergies indicates:   Allergen Reactions    Bee sting [allergen ext-venom-honey bee]        RELEVANT LABS/STUDIES:    Lab Results   Component Value Date    WBC 6.65 03/20/2023    HGB 15.2 03/20/2023    HCT 46.3 03/20/2023    MCV 90 03/20/2023     (L) 03/20/2023     BMP  Lab Results   Component Value Date     03/20/2023    K 5.2 (H) 03/20/2023     03/20/2023    CO2 25 03/20/2023    BUN 13 03/20/2023    CREATININE 1.0 03/20/2023    CALCIUM 9.5 03/20/2023    ANIONGAP 10 03/20/2023     Lab Results   Component Value Date    ALT 17 03/20/2023    AST 25 03/20/2023    ALKPHOS 69 03/20/2023    BILITOT 1.0 03/20/2023     Lab Results   Component Value Date    TSH 1.966 03/20/2023     No results found for: LABA1C, HGBA1C    VITALS  Vitals:    06/20/23 1005   BP: (!) 141/70   Pulse: (!) 56   Weight: 95.9 kg (211 lb 8.5 oz)       PHYSICAL  "EXAM  General: well developed, well nourished  Neurologic:   Gait: Normal   Psychomotor signs:  No involuntary movements or tremor  AIMS: none    PSYCHIATRIC EXAM:     Mental Status Exam:  Appearance: unremarkable, age appropriate  Behavior/Cooperation: normal, cooperative  Speech: normal tone, normal rate, normal pitch, normal volume  Language: uses words appropriately; NO aphasia or dysarthria  Mood:  "a little apprehensive"  Affect: reactive, appropriate, anxious  Thought Process: normal and logical  Thought Content: normal, no suicidality, no homicidality, delusions, or paranoia  Level of Consciousness: Alert and Oriented x3  Memory:  Intact   3/3 immediate, 3/3 at 5 minutes    Recent:  Intact; able to report recent events   Remote: intact, able to name 4/4 presidents  Intact; Named 4/4 past presidents   Attention/concentration: appropriate for age/education.   Fund of Knowledge: appears adequate  Insight:  Intact  Judgment: Intact       IMPRESSION:    Yao Burroughs is a 83 y.o. male with history of JYOTI, depression, unspecified psychosis who presents for follow up appointment. Patient said his depression is the same but anxiety is worse, said little things make him anxious and he's still hearing noises that he sometimes think turn into mumbling voices, however he recognizes that they are not really there. He expressed some frustration in not having frequent or consistent care, and lack of social activity that he enjoys in his environment. Encouraged him to engage more with his community and will reach out to  to see if he can get earlier appointment.     Status/Progress:  Based on the examination today, the patient's problem(s) is/are inadequately controlled.  New problems have not been presented today.     DIAGNOSES:    ICD-10-CM ICD-9-CM   1. JYOTI (generalized anxiety disorder)  F41.1 300.02   2. Depression, unspecified depression type  F32.A 311   3. Psychosis, unspecified psychosis type  F29 298.9 "       PLAN:  Psych Med:  Continue mirtazepine 30 mg qhs  Increase fluoxetine to 20 mg daily     Discussed with patient informed consent, risks vs. benefits, alternative treatments, side effect profile and the inherent unpredictability of individual responses to these treatments. Answered any questions patient may have had. The patient expresses understanding of the above and displays the capacity to agree with this current plan     Other: Labs reviewed    RETURN TO CLINIC:  in 1 month     Ruben Alex MD  [facility] PGY2  6/20/2023 9:59 AM

## 2023-07-18 ENCOUNTER — CLINICAL SUPPORT (OUTPATIENT)
Dept: PSYCHIATRY | Facility: CLINIC | Age: 83
End: 2023-07-18
Payer: MEDICARE

## 2023-07-18 VITALS
SYSTOLIC BLOOD PRESSURE: 127 MMHG | HEART RATE: 53 BPM | DIASTOLIC BLOOD PRESSURE: 68 MMHG | BODY MASS INDEX: 29.3 KG/M2 | WEIGHT: 210.13 LBS

## 2023-07-18 DIAGNOSIS — F32.4 MAJOR DEPRESSIVE DISORDER WITH SINGLE EPISODE, IN PARTIAL REMISSION: ICD-10-CM

## 2023-07-18 DIAGNOSIS — F41.1 GAD (GENERALIZED ANXIETY DISORDER): Primary | ICD-10-CM

## 2023-07-18 PROCEDURE — 99999 PR PBB SHADOW E&M-EST. PATIENT-LVL I: ICD-10-PCS | Mod: PBBFAC,,,

## 2023-07-18 PROCEDURE — 99211 OFF/OP EST MAY X REQ PHY/QHP: CPT | Mod: PBBFAC

## 2023-07-18 PROCEDURE — 99215 OFFICE O/P EST HI 40 MIN: CPT | Mod: S$PBB,,, | Performed by: PSYCHIATRY & NEUROLOGY

## 2023-07-18 PROCEDURE — 99999 PR PBB SHADOW E&M-EST. PATIENT-LVL I: CPT | Mod: PBBFAC,,,

## 2023-07-18 PROCEDURE — 99215 PR OFFICE/OUTPT VISIT, EST, LEVL V, 40-54 MIN: ICD-10-PCS | Mod: S$PBB,,, | Performed by: PSYCHIATRY & NEUROLOGY

## 2023-07-18 NOTE — PROGRESS NOTES
"OUTPATIENT PSYCHIATRY FOLLOW UP VISIT    ENCOUNTER DATE:  7/18/2023  SITE:  [facility], [facility]  LENGTH OF SESSION:  60 minutes      CHIEF COMPLAINT:  No chief complaint on file.      HISTORY OF PRESENTING ILLNESS:  Yao Burroughs is a 83 y.o. male with history of JYOTI, depression, unspecified psychosis who presents for follow up appointment.      Plan at last appointment on 3/20/23  Continue Mirtazapine 30mg qHS for depression, insomnia, appetite  Increase Fluoxetine 20mg qDaily for depression      Interval history as told by Patient - & or family/friend/spouse/caregiver with pts permission, daughter present    Mr Burroughs is here with his daughter Allison.    Mood is "Good".  He endorses some days feeling a bit down, but denies persistent sad/depressed mood. Pt lives in independent living in an assisted living facility. Pt says he doesn't find much motivation to do much except reading. He used to enjoy meeting with others to discuss books, politics, and history. Over the course of years he has become more socially isolated and less engaged in activities.  More recently he has an increased interest in reading and participation in the [employer]. He sees a counselor for therapy in private practice and talks to a spiritual director.  He enjoys daily mass at the facility.  No auditory hallucinations or paranoia.  Sometimes air conditioner or traffic feels like stadium crowd noise. He is not bothered by that.  He never hears actual vocalizations. Sleeping well.  No change in appetite or weight.  Energy is good.  He is hopeful for the future and feels that he is adjusting to his new living situation.  Sometimes he's still worrying excessively.  He spends less than 30 minutes per day worrying.  When he worries, he worries about hurricane season, if he will need to evacuate, or finances.  He is compliant with Remeron and Prozac.  Denies medication side effects.                  Daughter said anxiety is far " "less over the past few months.  Depression has also improved.  His concentration is far better.            PSYCHIATRIC REVIEW OF SYSTEMS:(none/ yes- better/worse/stable/& what symptoms)    Symptoms of Depression: "well established in some plateau", some thoughts of hopelessness about mental health, lack of motivation, negative thoughts about self, denied anhedonia, denied SI    Symptoms of Patty/Hypomania: denied     Symptoms of psychosis: denied AVH, sometimes hears noises that seems somewhat voice like but recognizes that they're not real    Sleep: sleeping well    Appetite: good    Other:denied tobacco    Alcohol: very rarely, on Sunday nights sometimes    Illicit Drugs: denied    Psychosocial stressors: adjustment to [place] and independent living facility     Risk Parameters:  Patient reports no suicidal ideation  Patient reports no homicidal ideation  Patient reports no self-injurious behavior  Patient reports no violent behavior    PSYCHIATRIC MED REVIEW    Current psych meds  Medication side effects:  denied  Medication compliance:  yes    Previous psych meds trials  Prozac, remeron    PAST PSYCHIATRIC, MEDICAL, AND SOCIAL HISTORY REVIEWED  The patient's past medical, family and social history have been reviewed and updated as appropriate within the electronic medical record - see encounter notes.    MEDICAL REVIEW OF SYSTEMS:  Complete review of systems performed covering Constitutional, Musculoskeletal, Neurologic.  All systems negative/ except none    ALL MEDICATIONS:    Current Outpatient Medications:     atorvastatin (LIPITOR) 20 MG tablet, Take 1 tablet (20 mg total) by mouth once daily., Disp: 30 tablet, Rfl: 1    cholecalciferol, vitamin D3, (D3-2000 ORAL), Take by mouth., Disp: , Rfl:     cyanocobalamin, vitamin B-12, 1,000 mcg TbSR, Take by mouth., Disp: , Rfl:     EPINEPHrine (EPIPEN) 0.3 mg/0.3 mL AtIn, USE AS DIRECTED FOR ALLERGIC REACTIONS, Disp: , Rfl:     FLUoxetine 20 MG capsule, Take 1 " "capsule (20 mg total) by mouth once daily., Disp: 90 capsule, Rfl: 1    losartan (COZAAR) 25 MG tablet, Take 1 tablet by mouth once daily., Disp: , Rfl:     metoprolol tartrate (LOPRESSOR) 50 MG tablet, TAKE 1 TABLET(50 MG) BY MOUTH TWICE DAILY, Disp: 180 tablet, Rfl: 1    mirtazapine (REMERON) 30 MG tablet, Take 1 tablet (30 mg total) by mouth every evening., Disp: 90 tablet, Rfl: 1    tamsulosin (FLOMAX) 0.4 mg Cap, Take 1 capsule (0.4 mg total) by mouth once daily., Disp: 90 capsule, Rfl: 1    ALLERGIES:  Review of patient's allergies indicates:   Allergen Reactions    Bee sting [allergen ext-venom-honey bee]        RELEVANT LABS/STUDIES:    Lab Results   Component Value Date    WBC 6.65 03/20/2023    HGB 15.2 03/20/2023    HCT 46.3 03/20/2023    MCV 90 03/20/2023     (L) 03/20/2023     BMP  Lab Results   Component Value Date     03/20/2023    K 5.2 (H) 03/20/2023     03/20/2023    CO2 25 03/20/2023    BUN 13 03/20/2023    CREATININE 1.0 03/20/2023    CALCIUM 9.5 03/20/2023    ANIONGAP 10 03/20/2023     Lab Results   Component Value Date    ALT 17 03/20/2023    AST 25 03/20/2023    ALKPHOS 69 03/20/2023    BILITOT 1.0 03/20/2023     Lab Results   Component Value Date    TSH 1.966 03/20/2023     No results found for: LABA1C, HGBA1C    VITALS  Vitals:    07/18/23 0954   BP: 127/68   Pulse: (!) 53   Weight: 95.3 kg (210 lb 1.6 oz)       PHYSICAL EXAM  General: well developed, well nourished  Neurologic:   Gait: Normal   Psychomotor signs:  No involuntary movements or tremor  AIMS: none    PSYCHIATRIC EXAM:     Mental Status Exam:  Appearance: unremarkable, age appropriate  Behavior/Cooperation: normal, cooperative  Speech: normal tone, normal rate, normal pitch, normal volume  Language: uses words appropriately; NO aphasia or dysarthria  Mood: "good"  Affect: reactive, appropriate, anxious  Thought Process: normal and logical  Thought Content: normal, no suicidality, no homicidality, delusions, or " paranoia  Level of Consciousness: Alert and Oriented x3  Memory:  Intact   Recent:  Intact; able to report recent events   Remote: intact, able to recall past events  Attention/concentration: appropriate for age/education.   Fund of Knowledge: appears adequate  Insight:  Intact, has awareness of illness   Judgment: Intact, behavior appropriate to circumstance       IMPRESSION:    Yao Burroughs is a 83 y.o. male with history of JYOTI, depression, unspecified psychosis who presents for follow up appointment. Pt with improvement in anxiety and depressive symptoms since increase of prozac.     Status/Progress:  Based on the examination today, the patient's problem(s) is/are adequately controlled.  New problems have not been presented today.     DIAGNOSES:    ICD-10-CM ICD-9-CM   1. JYOTI (generalized anxiety disorder)  F41.1 300.02   2. Major depressive disorder with single episode, in partial remission  F32.4 296.25         PLAN:  Psych Med:  Continue mirtazepine 30 mg qhs  Continue fluoxetine 20 mg daily   Encouraged regular exercise and engagement with his community    Discussed with patient informed consent, risks vs. benefits, alternative treatments, side effect profile and the inherent unpredictability of individual responses to these treatments. Answered any questions patient may have had. The patient expresses understanding of the above and displays the capacity to agree with this current plan     Other: Labs reviewed    RETURN TO CLINIC:  in 3 months     Rosy Jacinto MD, PhD  [facility]  -4  07/18/2023

## 2023-07-31 DIAGNOSIS — N40.1 BENIGN PROSTATIC HYPERPLASIA WITH URINARY FREQUENCY: ICD-10-CM

## 2023-07-31 DIAGNOSIS — R35.0 BENIGN PROSTATIC HYPERPLASIA WITH URINARY FREQUENCY: ICD-10-CM

## 2023-07-31 RX ORDER — TAMSULOSIN HYDROCHLORIDE 0.4 MG/1
0.4 CAPSULE ORAL DAILY
Qty: 90 CAPSULE | Refills: 1 | Status: SHIPPED | OUTPATIENT
Start: 2023-07-31 | End: 2024-01-25

## 2023-07-31 NOTE — TELEPHONE ENCOUNTER
Care Due:                  Date            Visit Type   Department     Provider  --------------------------------------------------------------------------------                                EP -                              PRIMARY      [facility]  Last Visit: 11-      CARE ([facility])   MEDICINE       Randall Aleman  Next Visit: None Scheduled  None         None Found                                                            Last  Test          Frequency    Reason                     Performed    Due Date  --------------------------------------------------------------------------------    Lipid Panel.  12 months..  atorvastatin.............  Not Found    Overdue    [employer] Embedded Care Due Messages. Reference number: [id number].   7/31/2023 10:40:46 AM CDT

## 2023-07-31 NOTE — TELEPHONE ENCOUNTER
Provider Staff:  Action required for this patient     Please see care gap opportunities below in Care Due Message: Labs (lipid panel) outdated.     Thanks!  [facility]     Appointments     Last Visit   11/15/2022 Randall Aleman MD   Next Visit   Visit date not found Randall Aleman MD       Refill Decision Note   Yao Burroughs  is requesting a refill authorization.  Brief Assessment and Rationale for Refill:  Approve       Medication Therapy Plan:  Labs (lipid panel) outdated      Comments:   Note composed:3:47 PM 07/31/2023

## 2023-08-14 ENCOUNTER — TELEPHONE (OUTPATIENT)
Dept: INTERNAL MEDICINE | Facility: CLINIC | Age: 83
End: 2023-08-14
Payer: MEDICARE

## 2023-08-14 NOTE — TELEPHONE ENCOUNTER
Spoke to patient and Dr Aleman is the only physician I see on the care team---number given to patient for [facility] support team to see if they can help

## 2023-08-14 NOTE — TELEPHONE ENCOUNTER
----- Message from Mckayla Garcia sent at 8/14/2023 10:26 AM CDT -----  Regarding: Medical Assistance  Contact: Patient  Patient is requesting a call back in regards to [facility] account   Patient stated he has physicians listed who he no longer apart of his care team and he would like to have them removed   Attempted to resolve issue for patient but we do not have acces to see any other physicians listed than Dr. Aleman   Please Assist     Patient can be reached at [phone]

## 2023-10-03 DIAGNOSIS — E78.5 HYPERLIPIDEMIA, UNSPECIFIED HYPERLIPIDEMIA TYPE: ICD-10-CM

## 2023-10-03 RX ORDER — ATORVASTATIN CALCIUM 20 MG/1
20 TABLET, FILM COATED ORAL DAILY
Qty: 30 TABLET | Refills: 1 | Status: SHIPPED | OUTPATIENT
Start: 2023-10-03 | End: 2023-11-09 | Stop reason: SDUPTHER

## 2023-10-03 NOTE — TELEPHONE ENCOUNTER
Refill Routing Note   Medication(s) are not appropriate for processing by [facility] for the following reason(s):      Required labs outdated: Lipid panel    [facility] action(s):  Defer Care Due:  Labs due   Medication Therapy Plan:         Appointments  past 12m or future 3m with PCP    Date Provider   Last Visit   11/15/2022 Randall Aleman MD   Next Visit   Visit date not found Randall Aleman MD   ED visits in past 90 days: 0        Note composed:3:24 PM 10/03/2023

## 2023-10-03 NOTE — TELEPHONE ENCOUNTER
----- Message from Nel Padilla sent at 10/3/2023  2:41 PM CDT -----  Contact: pt [phone]  Requesting an RX refill or new RX.  Is this a refill or new RX: Refill  RX name and strength: atorvastatin (LIPITOR) 20 MG tablet  Is this a 30 day or 90 day RX: 90 day with refills  Pharmacy name and phone #:   [id number] - [place], [place] - [address] AT [facility] [address]  [address]  [facility]  [facility] [address]  Phone: [phone] Fax: [phone]    Comments:     Thank You

## 2023-10-03 NOTE — TELEPHONE ENCOUNTER
No care due was identified.  [facility] Embedded Care Due Messages. Reference number: [id number].   10/03/2023 2:48:26 PM CDT

## 2023-10-18 ENCOUNTER — CLINICAL SUPPORT (OUTPATIENT)
Dept: PSYCHIATRY | Facility: CLINIC | Age: 83
End: 2023-10-18
Payer: MEDICARE

## 2023-10-18 VITALS
DIASTOLIC BLOOD PRESSURE: 70 MMHG | HEART RATE: 55 BPM | SYSTOLIC BLOOD PRESSURE: 146 MMHG | WEIGHT: 212.06 LBS | BODY MASS INDEX: 29.58 KG/M2

## 2023-10-18 DIAGNOSIS — F41.1 GAD (GENERALIZED ANXIETY DISORDER): ICD-10-CM

## 2023-10-18 DIAGNOSIS — F32.4 MAJOR DEPRESSIVE DISORDER WITH SINGLE EPISODE, IN PARTIAL REMISSION: Primary | ICD-10-CM

## 2023-10-18 PROCEDURE — 99212 OFFICE O/P EST SF 10 MIN: CPT | Mod: PBBFAC

## 2023-10-18 PROCEDURE — 99213 PR OFFICE/OUTPT VISIT, EST, LEVL III, 20-29 MIN: ICD-10-PCS | Mod: S$PBB,,, | Performed by: PSYCHIATRY & NEUROLOGY

## 2023-10-18 PROCEDURE — 99999 PR PBB SHADOW E&M-EST. PATIENT-LVL II: ICD-10-PCS | Mod: PBBFAC,,,

## 2023-10-18 PROCEDURE — 99999 PR PBB SHADOW E&M-EST. PATIENT-LVL II: CPT | Mod: PBBFAC,,,

## 2023-10-18 PROCEDURE — 99213 OFFICE O/P EST LOW 20 MIN: CPT | Mod: S$PBB,,, | Performed by: PSYCHIATRY & NEUROLOGY

## 2023-10-18 RX ORDER — MIRTAZAPINE 30 MG/1
30 TABLET, FILM COATED ORAL NIGHTLY
Qty: 90 TABLET | Refills: 1 | Status: SHIPPED | OUTPATIENT
Start: 2023-10-18 | End: 2024-04-15

## 2023-10-18 RX ORDER — FLUOXETINE HYDROCHLORIDE 20 MG/1
20 CAPSULE ORAL DAILY
Qty: 90 CAPSULE | Refills: 1 | Status: SHIPPED | OUTPATIENT
Start: 2023-10-18 | End: 2024-04-15

## 2023-10-18 NOTE — PROGRESS NOTES
"OUTPATIENT PSYCHIATRY FOLLOW UP VISIT    ENCOUNTER DATE:  10/18/2023  SITE:  [facility]  LENGTH OF SESSION:  29 minutes      CHIEF COMPLAINT:  No chief complaint on file.      HISTORY OF PRESENTING ILLNESS:  Yao Burroughs is a 83 y.o. male with history of JYOTI, depression, unspecified psychosis who presents for follow up appointment.      Plan at last appointment on 7/18/23  Continue mirtazepine 30 mg qhs  Continue fluoxetine 20 mg daily   Encouraged regular exercise and engagement with his community      Interval history as told by Patient - & or family/friend/spouse/caregiver with pts permission    Today, pt stated that "things are pretty good." Mood has improved, although feels "the depression is always there." Denied any current or recent hopelessness or SI. Feels anxiety has been well-controlled. Sleeping and eating well. Denied any issues with energy level or concentration.   On October 31, will be 1 year anniversary since he's moved to [facility] from [place] to be closer to family. Likes living at his assisted living facility ([facility]), but feels he's still going through an adjustment period with regards to moving here. Denied any hx of seasonal patterns to depression; doesn't feel the lack of seasons in [facility] would necessarily be an issue.   Has 1 prior psychiatric hospitalization at [facility] in 2022 for paranoia, AH. Stated that at the time he was "very depressed, isolated" and felt he could hear his neighbor's talking badly about him. He hasn't had any symptoms like that since. He stated that occasionally he'll hear "mechanical sounds" at night that sound like a radio, etc., but this doesn't bother him.   Stated that he's list interest in some of the hobbies he used to enjoy when he was younger, but still enjoys reading. Has been going on daily walks around his neighborhood and has a stationary bike, although he hasn't started using it yet. Involved with Mu-ism services at " "the assisted living community (affiliated with [facility]). Had a spiritual director, and recently found another one that can better assist with his needs. In addition, he's been following up with a therapist [email or web address], which he finds to be helpful.   Continues to become more involved in his community. Got a library card recently and registered to vote. Also reached out to [employer], who will assist with setting him up with a local volunteer agency. He stated that members of the volunteering agency also meet for monthly spiritual meetings.   Current psychiatric medications are fluoxetine 20mg daily and mirtazapine 30mg qhs; finds these to be helpful and denied any s/e.    Denied any new medications.   Discussed continuing follow-up at [facility] clinic until at least March 2024 (1 year anniversary of initial eval); at that time, if he's continuing to do well, can consider transferring care to PCP.   Will continue current med regimen. Pt agreeable to plan; denied any other questions/concerns.       PSYCHIATRIC REVIEW OF SYSTEMS:(none/ yes- better/worse/stable/& what symptoms)    Symptoms of Depression: Mood has improved, although feels "the depression is always there." Some anhedonia. Denied any current or recent hopelessness or SI.    Symptoms of Patty/Hypomania: denied     Symptoms of psychosis: denied AVH, sometimes hears noises that seems somewhat voice like but recognizes that they're not real    Sleep: sleeping well    Appetite: good    Other:denied tobacco    Alcohol: very rarely, on Sunday nights sometimes    Illicit Drugs: denied    Psychosocial stressors: adjustment to [place] and independent living facility     Risk Parameters:  Patient reports no suicidal ideation  Patient reports no homicidal ideation  Patient reports no self-injurious behavior  Patient reports no violent behavior    PSYCHIATRIC MED REVIEW    Current psych meds  Medication side effects:  denied  Medication " compliance:  yes    Previous psych meds trials  Prozac, remeron    PAST PSYCHIATRIC, MEDICAL, AND SOCIAL HISTORY REVIEWED  The patient's past medical, family and social history have been reviewed and updated as appropriate within the electronic medical record - see encounter notes.    MEDICAL REVIEW OF SYSTEMS:  Complete review of systems performed covering Constitutional, Musculoskeletal, Neurologic.  All systems negative/ except none    ALL MEDICATIONS:    Current Outpatient Medications:     atorvastatin (LIPITOR) 20 MG tablet, Take 1 tablet (20 mg total) by mouth once daily., Disp: 30 tablet, Rfl: 1    cholecalciferol, vitamin D3, (D3-2000 ORAL), Take by mouth., Disp: , Rfl:     cyanocobalamin, vitamin B-12, 1,000 mcg TbSR, Take by mouth., Disp: , Rfl:     EPINEPHrine (EPIPEN) 0.3 mg/0.3 mL AtIn, USE AS DIRECTED FOR ALLERGIC REACTIONS, Disp: , Rfl:     FLUoxetine 20 MG capsule, Take 1 capsule (20 mg total) by mouth once daily., Disp: 90 capsule, Rfl: 1    losartan (COZAAR) 25 MG tablet, Take 1 tablet by mouth once daily., Disp: , Rfl:     metoprolol tartrate (LOPRESSOR) 50 MG tablet, TAKE 1 TABLET(50 MG) BY MOUTH TWICE DAILY, Disp: 180 tablet, Rfl: 1    mirtazapine (REMERON) 30 MG tablet, Take 1 tablet (30 mg total) by mouth every evening., Disp: 90 tablet, Rfl: 1    tamsulosin (FLOMAX) 0.4 mg Cap, Take 1 capsule (0.4 mg total) by mouth once daily., Disp: 90 capsule, Rfl: 1    ALLERGIES:  Review of patient's allergies indicates:   Allergen Reactions    Bee sting [allergen ext-venom-honey bee]        RELEVANT LABS/STUDIES:    Lab Results   Component Value Date    WBC 6.65 03/20/2023    HGB 15.2 03/20/2023    HCT 46.3 03/20/2023    MCV 90 03/20/2023     (L) 03/20/2023     BMP  Lab Results   Component Value Date     03/20/2023    K 5.2 (H) 03/20/2023     03/20/2023    CO2 25 03/20/2023    BUN 13 03/20/2023    CREATININE 1.0 03/20/2023    CALCIUM 9.5 03/20/2023    ANIONGAP 10 03/20/2023     Lab  "Results   Component Value Date    ALT 17 03/20/2023    AST 25 03/20/2023    ALKPHOS 69 03/20/2023    BILITOT 1.0 03/20/2023     Lab Results   Component Value Date    TSH 1.966 03/20/2023     No results found for: "LABA1C", "HGBA1C"    VITALS  Vitals:    10/18/23 0951   BP: (!) 146/70   Pulse: (!) 55   Weight: 96.2 kg (212 lb 1.3 oz)       PHYSICAL EXAM  General: well developed, well nourished  Neurologic:   Gait: Normal   Psychomotor signs:  No involuntary movements or tremor  AIMS: none    PSYCHIATRIC EXAM:     Mental Status Exam:  Appearance: unremarkable, age appropriate  Behavior/Cooperation: normal, cooperative  Speech: normal tone, normal rate, normal pitch, normal volume  Language: uses words appropriately; NO aphasia or dysarthria  Mood: "good"  Affect: reactive, appropriate  Thought Process: normal and logical  Thought Content: normal, no suicidality, no homicidality, delusions, or paranoia  Level of Consciousness: Alert and Oriented x4  Memory:  Intact   Recent:  Intact; able to report recent events   Remote: intact, able to recall past events  Attention/concentration: appropriate for age/education.   Fund of Knowledge: appears adequate  Insight:  Intact, has awareness of illness   Judgment: Intact, behavior appropriate to circumstance       IMPRESSION:    Yao Burroughs is a 83 y.o. male with history of JYOTI, depression, unspecified psychosis who presents for follow up appointment. Pt with improvement in anxiety and depressive symptoms with current medication regimen.     Status/Progress:  Based on the examination today, the patient's problem(s) is/are adequately controlled.  New problems have not been presented today.     DIAGNOSES:    ICD-10-CM ICD-9-CM   1. Major depressive disorder with single episode, in partial remission  F32.4 296.25   2. JYOTI (generalized anxiety disorder)  F41.1 300.02       PLAN:  Psych Med:  Continue mirtazepine 30 mg qhs  Continue fluoxetine 20 mg daily   Encouraged regular " exercise and engagement with his community  Discussed continuing [facility] until at least March 2024 (1 year anniversary of initial eval); at that time, if he's continuing to do well, can consider transferring care to PCP    Discussed with patient informed consent, risks vs. benefits, alternative treatments, side effect profile and the inherent unpredictability of individual responses to these treatments. Answered any questions patient may have had. The patient expresses understanding of the above and displays the capacity to agree with this current plan     Other: Labs reviewed    RETURN TO CLINIC:  ~5 months, or sooner ASHWINI Riojas MD  [facility]  [facility]  10/18/2023

## 2023-11-09 DIAGNOSIS — E78.5 HYPERLIPIDEMIA, UNSPECIFIED HYPERLIPIDEMIA TYPE: ICD-10-CM

## 2023-11-09 RX ORDER — ATORVASTATIN CALCIUM 20 MG/1
20 TABLET, FILM COATED ORAL
Qty: 90 TABLET | Refills: 0 | Status: SHIPPED | OUTPATIENT
Start: 2023-11-09 | End: 2024-01-25

## 2023-11-09 NOTE — TELEPHONE ENCOUNTER
Care Due:                  Date            Visit Type   Department     Provider  --------------------------------------------------------------------------------                                EP -                              PRIMARY      [facility]  Last Visit: 11-      CARE ([facility])   MEDICINE       Randall Aleman  Next Visit: None Scheduled  None         None Found                                                            Last  Test          Frequency    Reason                     Performed    Due Date  --------------------------------------------------------------------------------    Lipid Panel.  12 months..  atorvastatin.............  Not Found    Overdue    [employer] Embedded Care Due Messages. Reference number: [id number].   11/09/2023 10:59:31 AM CST

## 2023-11-09 NOTE — TELEPHONE ENCOUNTER
Refill Routing Note   Medication(s) are not appropriate for processing by [facility] for the following reason(s):      Required labs outdated    [facility] action(s):  Defer Care Due:  Labs due            Appointments  past 12m or future 3m with PCP    Date Provider   Last Visit   11/15/2022 Randall Aleman MD   Next Visit   Visit date not found Randall Aleman MD   ED visits in past 90 days: 0        Note composed:4:14 PM 11/09/2023

## 2023-11-10 ENCOUNTER — TELEPHONE (OUTPATIENT)
Dept: INTERNAL MEDICINE | Facility: CLINIC | Age: 83
End: 2023-11-10
Payer: MEDICARE

## 2023-11-10 NOTE — TELEPHONE ENCOUNTER
----- Message from Randall Aleman MD sent at 11/9/2023  8:52 PM CST -----  Regarding: Visit  I will refill med but I think he needs his yearly  Visit

## 2023-11-10 NOTE — TELEPHONE ENCOUNTER
"Called pt; pt answered    Asked pt if he would like to schedule his annual appt  Pt declined to schedule at this time, "I'm not at my calendar right now"    Pt elected to call us back to schedule annual appt   "

## 2023-11-14 ENCOUNTER — PATIENT MESSAGE (OUTPATIENT)
Dept: PSYCHIATRY | Facility: CLINIC | Age: 83
End: 2023-11-14
Payer: MEDICARE

## 2023-11-14 RX ORDER — LOSARTAN POTASSIUM 25 MG/1
25 TABLET ORAL DAILY
Qty: 90 TABLET | Refills: 0 | Status: SHIPPED | OUTPATIENT
Start: 2023-11-14 | End: 2024-01-25

## 2023-11-14 NOTE — TELEPHONE ENCOUNTER
Refill Routing Note   Medication(s) are not appropriate for processing by [facility] for the following reason(s):        Required labs abnormal:     [facility] action(s):  Defer      Medication Therapy Plan:         Appointments  past 12m or future 3m with PCP    Date Provider   Last Visit   11/15/2022 Randall Aleman MD   Next Visit   12/29/2023 Randall Aleman MD   ED visits in past 90 days: 0        Note composed:2:49 PM 11/14/2023

## 2023-11-14 NOTE — TELEPHONE ENCOUNTER
----- Message from Laura Hahn sent at 11/14/2023 12:20 PM CST -----  Contact: [phone]  Requesting an RX refill or new RX.  Is this a refill or new RX: refill   RX name and strength (copy/paste from chart):  losartan (COZAAR) 25 MG tablet  Is this a 30 day or 90 day RX: 90  Pharmacy name and phone # (copy/paste from chart):    [id number] - [place], [place] - [address] AT [facility] [address]  [facility]  [facility] [address]  Phone: [phone] Fax: [phone]

## 2023-11-14 NOTE — TELEPHONE ENCOUNTER
No care due was identified.  [facility] Embedded Care Due Messages. Reference number: [id number].   11/14/2023 2:23:09 PM CST

## 2023-12-03 NOTE — PROGRESS NOTES
Staff Note:     Pt interviewed and case discussed.  I agree with Resident's findings and treatment plan.  First visit to this clinic for Pt who had first and only psychotic illness following multiple losses, life transition, and resulting depression.  By this visit Pt had already stopped Risperdal with no return of hallucinations.  Insight and support system are good.  Depression was additionally treated and lab ordered.    KELLY

## 2023-12-03 NOTE — PROGRESS NOTES
Staff Note:     Pt interviewed and case discussed.  I agree with Resident's findings and treatment plan.  Hallucinations are rare and do not influence behavior.  Mood is significantly better than at the start of treatment here.  Pt is slowly getting adjusted to his new life and environment here.    KELLY

## 2023-12-03 NOTE — PROGRESS NOTES
Staff Note:     Pt interviewed and case discussed.  I agree with Resident's findings and treatment plan.  Pt returned for f/u reporting recent anxiety and a return of mild paranoia and auditory hallucinations.  He has good insight and does not want to take an antipsychotic.  Prozac was increased and additional time allowed for full response.  Lab results were OK.    KELLY

## 2023-12-03 NOTE — PROGRESS NOTES
Staff Note:     Pt interviewed and case discussed.  I agree with Resident's findings and treatment plan.  Hallucinations were absent at this visit and depression and anxiety were reduced.  Tx was continued.    KELLY

## 2023-12-29 ENCOUNTER — OFFICE VISIT (OUTPATIENT)
Dept: INTERNAL MEDICINE | Facility: CLINIC | Age: 83
End: 2023-12-29
Payer: MEDICARE

## 2023-12-29 ENCOUNTER — PATIENT MESSAGE (OUTPATIENT)
Dept: INTERNAL MEDICINE | Facility: CLINIC | Age: 83
End: 2023-12-29

## 2023-12-29 VITALS
OXYGEN SATURATION: 97 % | WEIGHT: 212.31 LBS | DIASTOLIC BLOOD PRESSURE: 82 MMHG | SYSTOLIC BLOOD PRESSURE: 132 MMHG | BODY MASS INDEX: 29.72 KG/M2 | HEIGHT: 71 IN | HEART RATE: 72 BPM

## 2023-12-29 DIAGNOSIS — F32.A DEPRESSION, UNSPECIFIED DEPRESSION TYPE: Primary | ICD-10-CM

## 2023-12-29 DIAGNOSIS — N40.1 BENIGN PROSTATIC HYPERPLASIA WITH URINARY FREQUENCY: ICD-10-CM

## 2023-12-29 DIAGNOSIS — F32.A DEPRESSION, UNSPECIFIED DEPRESSION TYPE: ICD-10-CM

## 2023-12-29 DIAGNOSIS — R35.0 BENIGN PROSTATIC HYPERPLASIA WITH URINARY FREQUENCY: ICD-10-CM

## 2023-12-29 DIAGNOSIS — L98.9 SKIN LESION: ICD-10-CM

## 2023-12-29 DIAGNOSIS — F41.1 GAD (GENERALIZED ANXIETY DISORDER): Primary | ICD-10-CM

## 2023-12-29 DIAGNOSIS — I10 PRIMARY HYPERTENSION: ICD-10-CM

## 2023-12-29 PROCEDURE — 99214 PR OFFICE/OUTPT VISIT, EST, LEVL IV, 30-39 MIN: ICD-10-PCS | Mod: S$PBB,,, | Performed by: INTERNAL MEDICINE

## 2023-12-29 PROCEDURE — 99214 OFFICE O/P EST MOD 30 MIN: CPT | Mod: PBBFAC | Performed by: INTERNAL MEDICINE

## 2023-12-29 PROCEDURE — 99999 PR PBB SHADOW E&M-EST. PATIENT-LVL IV: ICD-10-PCS | Mod: PBBFAC,,, | Performed by: INTERNAL MEDICINE

## 2023-12-29 PROCEDURE — 99214 OFFICE O/P EST MOD 30 MIN: CPT | Mod: S$PBB,,, | Performed by: INTERNAL MEDICINE

## 2023-12-29 PROCEDURE — 99999 PR PBB SHADOW E&M-EST. PATIENT-LVL IV: CPT | Mod: PBBFAC,,, | Performed by: INTERNAL MEDICINE

## 2023-12-29 RX ORDER — EPINEPHRINE 0.3 MG/.3ML
INJECTION SUBCUTANEOUS
Qty: 1 EACH | Refills: 1 | Status: SHIPPED | OUTPATIENT
Start: 2023-12-29

## 2023-12-29 RX ORDER — METOPROLOL TARTRATE 50 MG/1
50 TABLET ORAL 2 TIMES DAILY
Qty: 180 TABLET | Refills: 4 | Status: SHIPPED | OUTPATIENT
Start: 2023-12-29

## 2023-12-29 NOTE — PROGRESS NOTES
Subjective:       Patient ID: Yao Burroughs is a 83 y.o. male.    Chief Complaint: Annual Exam    Patient in for annual follow-up of medical problems including depression, hypertension, history of psychosis.  He continues to see Psychiatry and is doing very well.  He is under some stress as he found out that they will be renovating his apartment building and he has to move out in the next few weeks.  He was luckily able to get a new lease nearby.  He needs a few prescription refills.  He has a skin lesion on the forehead that at times will peel or is crusty then peels then returns.  I explained I would like for him to see Dermatology and will place a referral but he wishes to wait until after the new year.  He also is due for some labs for follow-up of blood pressure and lipids but he also wishes to wait until after he moves.   Son-in-law is a physician who helps keep track of his medication and general health as well.      Review of Systems   Constitutional:  Negative for fatigue and fever.   Respiratory:  Negative for cough, chest tightness and shortness of breath.    Cardiovascular:  Negative for chest pain and claudication.   Musculoskeletal:  Positive for arthralgias.   Integumentary:  Positive for mole/lesion.   Psychiatric/Behavioral:  Positive for dysphoric mood (stable on meds). The patient is nervous/anxious (stable on meds).            Past Medical History:   Diagnosis Date    Anxiety disorder, unspecified     Auditory hallucinations     Depression     Psychosis      History reviewed. No pertinent surgical history.   Patient Active Problem List   Diagnosis    Primary hypertension    Benign prostatic hyperplasia with urinary frequency    JYOTI (generalized anxiety disorder)    Depression    Psychosis    Auditory hallucinations        Objective:      Physical Exam  Constitutional:       General: He is not in acute distress.     Appearance: He is well-developed.   HENT:      Head: Normocephalic and atraumatic.       Right Ear: Tympanic membrane, ear canal and external ear normal.      Left Ear: Tympanic membrane, ear canal and external ear normal.      Mouth/Throat:      Pharynx: No oropharyngeal exudate or posterior oropharyngeal erythema.   Eyes:      General: No scleral icterus.     Conjunctiva/sclera: Conjunctivae normal.      Pupils: Pupils are equal, round, and reactive to light.   Neck:      Thyroid: No thyromegaly.      Comments: No supraclavicular nodes palpated  Cardiovascular:      Rate and Rhythm: Normal rate and regular rhythm.      Pulses: Normal pulses.      Heart sounds: Normal heart sounds. No murmur heard.  Pulmonary:      Effort: Pulmonary effort is normal.      Breath sounds: Normal breath sounds. No wheezing.   Abdominal:      General: Bowel sounds are normal.      Palpations: Abdomen is soft. There is no mass.      Tenderness: There is no abdominal tenderness.   Musculoskeletal:         General: No tenderness.      Cervical back: Normal range of motion and neck supple.      Right lower leg: No edema.      Left lower leg: No edema.   Lymphadenopathy:      Cervical: No cervical adenopathy.   Skin:     Coloration: Skin is not jaundiced or pale.      Findings: Lesion (seborrheic keratosis versus actinic keratosis on forehead.  Urged to get a dermatology appointment in the near future) present.   Neurological:      General: No focal deficit present.      Mental Status: He is alert and oriented to person, place, and time.   Psychiatric:         Mood and Affect: Mood normal.         Behavior: Behavior normal.         Assessment:       Problem List Items Addressed This Visit          Psychiatric    JYOTI (generalized anxiety disorder) - Primary    Relevant Orders    Basic Metabolic Panel    Lipid Panel    AST (SGOT)    CBC Auto Differential    Depression    Relevant Orders    Basic Metabolic Panel    Lipid Panel    AST (SGOT)    CBC Auto Differential       Cardiac/Vascular    Primary hypertension    Relevant  "Orders    Basic Metabolic Panel    Lipid Panel    AST (SGOT)    CBC Auto Differential       Renal/    Benign prostatic hyperplasia with urinary frequency    Relevant Orders    Basic Metabolic Panel    Lipid Panel    AST (SGOT)    CBC Auto Differential     Other Visit Diagnoses       Skin lesion        Relevant Orders    Ambulatory referral/consult to Dermatology    Basic Metabolic Panel    Lipid Panel    AST (SGOT)    CBC Auto Differential            Plan:         Yao was seen today for annual exam.    Diagnoses and all orders for this visit:    JYOTI (generalized anxiety disorder)  -     Basic Metabolic Panel; Future  -     Lipid Panel; Future  -     AST (SGOT); Future  -     CBC Auto Differential; Future    Depression, unspecified depression type  -     Basic Metabolic Panel; Future  -     Lipid Panel; Future  -     AST (SGOT); Future  -     CBC Auto Differential; Future    Primary hypertension  -     Basic Metabolic Panel; Future  -     Lipid Panel; Future  -     AST (SGOT); Future  -     CBC Auto Differential; Future    Benign prostatic hyperplasia with urinary frequency  -     Basic Metabolic Panel; Future  -     Lipid Panel; Future  -     AST (SGOT); Future  -     CBC Auto Differential; Future    Skin lesion  -     Ambulatory referral/consult to Dermatology; Future  -     Basic Metabolic Panel; Future  -     Lipid Panel; Future  -     AST (SGOT); Future  -     CBC Auto Differential; Future    Other orders  -     EPINEPHrine (EPIPEN) 0.3 mg/0.3 mL AtIn; USE AS DIRECTED FOR ALLERGIC REACTIONS  -     metoprolol tartrate (LOPRESSOR) 50 MG tablet; Take 1 tablet (50 mg total) by mouth 2 (two) times daily.       Continue meds.  Labs as above.  Follow-up in a few months sooner p.r.n.      I offered to set up a Derm appointment for him but he wishes to set up on his own.         Portions of this note may have been created with voice recognition software. Occasional "wrong-word" or "sound-a-like" substitutions may " have occurred due to the inherent limitations of voice recognition software. Please, read the note carefully and recognize, using context, where substitutions have occurred.

## 2023-12-29 NOTE — PATIENT INSTRUCTIONS
Dermatology Consult order is in. You may call dermatology directly to set up an appointment or give our office a call and we can assist you.

## 2024-01-25 DIAGNOSIS — E78.5 HYPERLIPIDEMIA, UNSPECIFIED HYPERLIPIDEMIA TYPE: ICD-10-CM

## 2024-01-25 DIAGNOSIS — R35.0 BENIGN PROSTATIC HYPERPLASIA WITH URINARY FREQUENCY: ICD-10-CM

## 2024-01-25 DIAGNOSIS — N40.1 BENIGN PROSTATIC HYPERPLASIA WITH URINARY FREQUENCY: ICD-10-CM

## 2024-01-25 RX ORDER — TAMSULOSIN HYDROCHLORIDE 0.4 MG/1
0.4 CAPSULE ORAL
Qty: 90 CAPSULE | Refills: 3 | Status: SHIPPED | OUTPATIENT
Start: 2024-01-25

## 2024-01-25 RX ORDER — ATORVASTATIN CALCIUM 20 MG/1
20 TABLET, FILM COATED ORAL
Qty: 90 TABLET | Refills: 0 | Status: SHIPPED | OUTPATIENT
Start: 2024-01-25 | End: 2024-05-01

## 2024-01-25 RX ORDER — LOSARTAN POTASSIUM 25 MG/1
25 TABLET ORAL
Qty: 90 TABLET | Refills: 0 | Status: SHIPPED | OUTPATIENT
Start: 2024-01-25 | End: 2024-05-01

## 2024-01-25 NOTE — TELEPHONE ENCOUNTER
Refill Routing Note   Medication(s) are not appropriate for processing by [facility] for the following reason(s):        Required labs abnormal  Required labs outdated    [facility] action(s):  Defer  Approve     Requires labs : Yes             Appointments  past 12m or future 3m with PCP    Date Provider   Last Visit   12/29/2023 Randall Aleman MD   Next Visit   Visit date not found Randall Aleman MD   ED visits in past 90 days: 0        Note composed:1:30 PM 01/25/2024

## 2024-01-25 NOTE — TELEPHONE ENCOUNTER
Care Due:                  Date            Visit Type   Department     Provider  --------------------------------------------------------------------------------                                EP -                              PRIMARY      [facility]  Last Visit: 12-      CARE ([facility])   MEDICINE       Randall Aleman  Next Visit: None Scheduled  None         None Found                                                            Last  Test          Frequency    Reason                     Performed    Due Date  --------------------------------------------------------------------------------    CMP.........  12 months..  atorvastatin, losartan...  03- 03-    Lipid Panel.  12 months..  atorvastatin.............  Not Found    Overdue    [employer] Embedded Care Due Messages. Reference number: [id number].   1/25/2024 1:11:42 PM CST

## 2024-02-14 NOTE — TELEPHONE ENCOUNTER
Called pt and spoke on the phone -- booked CBC, AST, LIP CHAMBERS., & BMP for 2/16 -- pt requested to also have TSH and urine analysis done as well -- orders pending

## 2024-02-16 ENCOUNTER — LAB VISIT (OUTPATIENT)
Dept: LAB | Facility: HOSPITAL | Age: 84
End: 2024-02-16
Attending: INTERNAL MEDICINE
Payer: MEDICARE

## 2024-02-16 ENCOUNTER — TELEPHONE (OUTPATIENT)
Dept: INTERNAL MEDICINE | Facility: CLINIC | Age: 84
End: 2024-02-16
Payer: MEDICARE

## 2024-02-16 DIAGNOSIS — R35.0 BENIGN PROSTATIC HYPERPLASIA WITH URINARY FREQUENCY: ICD-10-CM

## 2024-02-16 DIAGNOSIS — L98.9 SKIN LESION: ICD-10-CM

## 2024-02-16 DIAGNOSIS — I10 PRIMARY HYPERTENSION: ICD-10-CM

## 2024-02-16 DIAGNOSIS — F32.A DEPRESSION, UNSPECIFIED DEPRESSION TYPE: ICD-10-CM

## 2024-02-16 DIAGNOSIS — N40.1 BENIGN PROSTATIC HYPERPLASIA WITH URINARY FREQUENCY: ICD-10-CM

## 2024-02-16 DIAGNOSIS — F41.1 GAD (GENERALIZED ANXIETY DISORDER): ICD-10-CM

## 2024-02-16 LAB
ANION GAP SERPL CALC-SCNC: 8 MMOL/L (ref 8–16)
AST SERPL-CCNC: 19 U/L (ref 10–40)
BASOPHILS # BLD AUTO: 0.02 K/UL (ref 0–0.2)
BASOPHILS NFR BLD: 0.3 % (ref 0–1.9)
BUN SERPL-MCNC: 14 MG/DL (ref 8–23)
CALCIUM SERPL-MCNC: 9.5 MG/DL (ref 8.7–10.5)
CHLORIDE SERPL-SCNC: 109 MMOL/L (ref 95–110)
CHOLEST SERPL-MCNC: 120 MG/DL (ref 120–199)
CHOLEST/HDLC SERPL: 3.3 {RATIO} (ref 2–5)
CO2 SERPL-SCNC: 23 MMOL/L (ref 23–29)
CREAT SERPL-MCNC: 1.1 MG/DL (ref 0.5–1.4)
DIFFERENTIAL METHOD BLD: ABNORMAL
EOSINOPHIL # BLD AUTO: 0.1 K/UL (ref 0–0.5)
EOSINOPHIL NFR BLD: 1.8 % (ref 0–8)
ERYTHROCYTE [DISTWIDTH] IN BLOOD BY AUTOMATED COUNT: 13.2 % (ref 11.5–14.5)
EST. GFR  (NO RACE VARIABLE): >60 ML/MIN/1.73 M^2
GLUCOSE SERPL-MCNC: 107 MG/DL (ref 70–110)
HCT VFR BLD AUTO: 44.2 % (ref 40–54)
HDLC SERPL-MCNC: 36 MG/DL (ref 40–75)
HDLC SERPL: 30 % (ref 20–50)
HGB BLD-MCNC: 14.4 G/DL (ref 14–18)
IMM GRANULOCYTES # BLD AUTO: 0.01 K/UL (ref 0–0.04)
IMM GRANULOCYTES NFR BLD AUTO: 0.2 % (ref 0–0.5)
LDLC SERPL CALC-MCNC: 64.4 MG/DL (ref 63–159)
LYMPHOCYTES # BLD AUTO: 2.5 K/UL (ref 1–4.8)
LYMPHOCYTES NFR BLD: 41.6 % (ref 18–48)
MCH RBC QN AUTO: 31.2 PG (ref 27–31)
MCHC RBC AUTO-ENTMCNC: 32.6 G/DL (ref 32–36)
MCV RBC AUTO: 96 FL (ref 82–98)
MONOCYTES # BLD AUTO: 0.7 K/UL (ref 0.3–1)
MONOCYTES NFR BLD: 12.2 % (ref 4–15)
NEUTROPHILS # BLD AUTO: 2.7 K/UL (ref 1.8–7.7)
NEUTROPHILS NFR BLD: 43.9 % (ref 38–73)
NONHDLC SERPL-MCNC: 84 MG/DL
NRBC BLD-RTO: 0 /100 WBC
PLATELET # BLD AUTO: 124 K/UL (ref 150–450)
PMV BLD AUTO: 10.3 FL (ref 9.2–12.9)
POTASSIUM SERPL-SCNC: 4.5 MMOL/L (ref 3.5–5.1)
RBC # BLD AUTO: 4.62 M/UL (ref 4.6–6.2)
SODIUM SERPL-SCNC: 140 MMOL/L (ref 136–145)
TRIGL SERPL-MCNC: 98 MG/DL (ref 30–150)
TSH SERPL DL<=0.005 MIU/L-ACNC: 1.94 UIU/ML (ref 0.4–4)
WBC # BLD AUTO: 6.08 K/UL (ref 3.9–12.7)

## 2024-02-16 PROCEDURE — 85025 COMPLETE CBC W/AUTO DIFF WBC: CPT | Performed by: INTERNAL MEDICINE

## 2024-02-16 PROCEDURE — 36415 COLL VENOUS BLD VENIPUNCTURE: CPT | Performed by: INTERNAL MEDICINE

## 2024-02-16 PROCEDURE — 80048 BASIC METABOLIC PNL TOTAL CA: CPT | Performed by: INTERNAL MEDICINE

## 2024-02-16 PROCEDURE — 84450 TRANSFERASE (AST) (SGOT): CPT | Performed by: INTERNAL MEDICINE

## 2024-02-16 PROCEDURE — 84443 ASSAY THYROID STIM HORMONE: CPT | Performed by: INTERNAL MEDICINE

## 2024-02-16 PROCEDURE — 80061 LIPID PANEL: CPT | Performed by: INTERNAL MEDICINE

## 2024-02-16 NOTE — TELEPHONE ENCOUNTER
----- Message from Suzi Shepherd sent at 2/16/2024  3:04 PM CST -----  Contact: [phone]  Per pt, he has received some of his labs but not his urinalysis as of yet. He wanted to make the office aware.            Thank you

## 2024-02-22 ENCOUNTER — PATIENT MESSAGE (OUTPATIENT)
Dept: INTERNAL MEDICINE | Facility: CLINIC | Age: 84
End: 2024-02-22
Payer: MEDICARE

## 2024-02-27 DIAGNOSIS — Z00.00 ENCOUNTER FOR MEDICARE ANNUAL WELLNESS EXAM: ICD-10-CM

## 2024-03-12 ENCOUNTER — CLINICAL SUPPORT (OUTPATIENT)
Dept: PSYCHIATRY | Facility: CLINIC | Age: 84
End: 2024-03-12
Payer: MEDICARE

## 2024-03-12 VITALS
DIASTOLIC BLOOD PRESSURE: 70 MMHG | HEART RATE: 78 BPM | BODY MASS INDEX: 29.57 KG/M2 | SYSTOLIC BLOOD PRESSURE: 153 MMHG | WEIGHT: 212 LBS

## 2024-03-12 DIAGNOSIS — F41.1 GAD (GENERALIZED ANXIETY DISORDER): Primary | ICD-10-CM

## 2024-03-12 DIAGNOSIS — F29 PSYCHOSIS, UNSPECIFIED PSYCHOSIS TYPE: ICD-10-CM

## 2024-03-12 DIAGNOSIS — F32.A DEPRESSION, UNSPECIFIED DEPRESSION TYPE: ICD-10-CM

## 2024-03-12 PROCEDURE — 99999 PR PBB SHADOW E&M-EST. PATIENT-LVL III: CPT | Mod: PBBFAC,,,

## 2024-03-12 PROCEDURE — 99213 OFFICE O/P EST LOW 20 MIN: CPT | Mod: PBBFAC

## 2024-03-12 PROCEDURE — 99214 OFFICE O/P EST MOD 30 MIN: CPT | Mod: S$PBB,,, | Performed by: PSYCHIATRY & NEUROLOGY

## 2024-03-12 NOTE — PROGRESS NOTES
OUTPATIENT PSYCHIATRY FOLLOW UP VISIT    ENCOUNTER DATE:  3/12/2024  SITE:  [facility]  LENGTH OF SESSION:  29 minutes      CHIEF COMPLAINT:  No chief complaint on file.      HISTORY OF PRESENTING ILLNESS:  Yao Burroughs is a 83 y.o. male with history of JYOTI, depression, unspecified psychosis who presents for follow up appointment.      Plan at last appointment on 7/18/23  Continue mirtazepine 30 mg qhs  Continue fluoxetine 20 mg daily   Encouraged regular exercise and engagement with his community      Interval history as told by Patient -   At the last visit the patient was experiencing stress due to relocation to [place] into an  assisted living facility as well as other changes.  In the interim the patient had to move from the assisted living facility into his own apartment.  He states that he felt that this was best decision for him.  He does state that it has been somewhat difficult adjusting to living alone and having some of the tasks that the assisted living facility such as help with cleaning and laundry as his responsibility has been a major adjustment.  He states that he wants to undertake these tasks and do things for himself as long as I can.  He repeatedly states that he is getting grounded and working to find a new routine.  The patient states that he does have intermittent worsening of depression feelings of loneliness as well as anxiety.  Some of this is not only due to this transition but due to learning that his brother in [place] was recently injured in an accident and is homeless as he has some problems.  The patient states that he has been compliant with his medication regimen.  The patient states that his appetite has been fine and that his sleep has been fine due to the mirtazapine that he takes at night.  Although the patient states that he feels there is a slight worsening in his psychiatric condition he denies suicidal ideation, homicidal ideation,  "auditory hallucinations, visual hallucinations, or paranoia at this time.  He states that he is still looking forward to joining a local group that will give him the opportunity to volunteer and interact more socially.    PSYCHIATRIC REVIEW OF SYSTEMS:(none/ yes- better/worse/stable/& what symptoms)    Symptoms of Depression: Mood has improved, although feels "the depression is always there." Some anhedonia. Denied any current or recent hopelessness or SI.    Symptoms of Patty/Hypomania: denied     Symptoms of psychosis: denied AVH, sometimes hears noises that seems somewhat voice like but recognizes that they're not real    Sleep: sleeping well    Appetite: good    Other:denied tobacco    Alcohol: very rarely, on Sunday nights sometimes    Illicit Drugs: denied    Psychosocial stressors: adjustment to [place] and independent living facility     Risk Parameters:  Patient reports no suicidal ideation  Patient reports no homicidal ideation  Patient reports no self-injurious behavior  Patient reports no violent behavior    PSYCHIATRIC MED REVIEW    Current psych meds  Medication side effects:  denied  Medication compliance:  yes    Previous psych meds trials  Prozac, remeron    PAST PSYCHIATRIC, MEDICAL, AND SOCIAL HISTORY REVIEWED  The patient's past medical, family and social history have been reviewed and updated as appropriate within the electronic medical record - see encounter notes.    MEDICAL REVIEW OF SYSTEMS:  Complete review of systems performed covering Constitutional, Musculoskeletal, Neurologic.  All systems negative/ except none    ALL MEDICATIONS:    Current Outpatient Medications:     atorvastatin (LIPITOR) 20 MG tablet, TAKE 1 TABLET(20 MG) BY MOUTH EVERY DAY, Disp: 90 tablet, Rfl: 0    cholecalciferol, vitamin D3, (D3-2000 ORAL), Take by mouth., Disp: , Rfl:     cyanocobalamin, vitamin B-12, 1,000 mcg TbSR, Take by mouth., Disp: , Rfl:     EPINEPHrine (EPIPEN) 0.3 mg/0.3 mL AtIn, USE AS DIRECTED " "FOR ALLERGIC REACTIONS, Disp: 1 each, Rfl: 1    FLUoxetine 20 MG capsule, Take 1 capsule (20 mg total) by mouth once daily., Disp: 90 capsule, Rfl: 1    losartan (COZAAR) 25 MG tablet, TAKE 1 TABLET(25 MG) BY MOUTH EVERY DAY, Disp: 90 tablet, Rfl: 0    metoprolol tartrate (LOPRESSOR) 50 MG tablet, Take 1 tablet (50 mg total) by mouth 2 (two) times daily., Disp: 180 tablet, Rfl: 4    mirtazapine (REMERON) 30 MG tablet, Take 1 tablet (30 mg total) by mouth every evening., Disp: 90 tablet, Rfl: 1    tamsulosin (FLOMAX) 0.4 mg Cap, TAKE 1 CAPSULE(0.4 MG) BY MOUTH EVERY DAY, Disp: 90 capsule, Rfl: 3    ALLERGIES:  Review of patient's allergies indicates:   Allergen Reactions    Bee sting [allergen ext-venom-honey bee]        RELEVANT LABS/STUDIES:    Lab Results   Component Value Date    WBC 6.08 02/16/2024    HGB 14.4 02/16/2024    HCT 44.2 02/16/2024    MCV 96 02/16/2024     (L) 02/16/2024     BMP  Lab Results   Component Value Date     02/16/2024    K 4.5 02/16/2024     02/16/2024    CO2 23 02/16/2024    BUN 14 02/16/2024    CREATININE 1.1 02/16/2024    CALCIUM 9.5 02/16/2024    ANIONGAP 8 02/16/2024     Lab Results   Component Value Date    ALT 17 03/20/2023    AST 19 02/16/2024    ALKPHOS 69 03/20/2023    BILITOT 1.0 03/20/2023     Lab Results   Component Value Date    TSH 1.936 02/16/2024     No results found for: "LABA1C", "HGBA1C"    VITALS  There were no vitals filed for this visit.      PHYSICAL EXAM  General: well developed, well nourished  Neurologic:   Gait: Normal   Psychomotor signs:  No involuntary movements or tremor  AIMS: none    PSYCHIATRIC EXAM:     Mental Status Exam:  Appearance: unremarkable, age appropriate  Behavior/Cooperation: normal, cooperative  Speech: normal tone, normal rate, normal pitch, normal volume  Language: uses words appropriately; NO aphasia or dysarthria  Mood: "alright"  Affect: reactive, appropriate  Thought Process: normal and logical  Thought Content: " normal, no suicidality, no homicidality, delusions, or paranoia  Level of Consciousness: Alert and Oriented x4  Memory:  Intact   Recent:  Intact; able to report recent events   Remote: intact, able to recall past events  Attention/concentration: appropriate for age/education.   Fund of Knowledge: appears adequate  Insight:  Intact, has awareness of illness   Judgment: Intact, behavior appropriate to circumstance       IMPRESSION:    Yao Burroughs is a 83 y.o. male with history of JYOTI, depression, unspecified psychosis who presents for follow up appointment.  Patient has had mild worsening of his anxiety and depression however she notes that she knows this is attributed to stressors such as having to move out from the assisted living into his own apartment and subsequently having to undertake new tasks in his home.  He also states that learning that his younger brother was recently injured and is homeless is also a stressor.      The patient states that he does not desire to change his medication regimen at this time as he believes that once these acute stressors resolve he will see improvement in his symptomatology.      The patient state that she does not need any refills of his medication at this time and will call if he does to request them.    Status/Progress:  Based on the examination today, the patient's problem(s) is/are not adequately controlled however it is within the context of new stressor.  New problems have not been presented today.     DIAGNOSES:    1. Major depressive disorder with single episode, in partial remission  F32.4 296.25   2. JYOTI (generalized anxiety disorder)  F41.1 300.02       PLAN:  Psych Med:  Continue mirtazepine 30 mg qhs  Continue fluoxetine 20 mg daily   Call for refills when needed (patient declined prescriptions being sent today)  Encouraged regular exercise and engagement with his community  Discussed with patient informed consent, risks vs. benefits, alternative treatments,  side effect profile and the inherent unpredictability of individual responses to these treatments. Answered any questions patient may have had. The patient expresses understanding of the above and displays the capacity to agree with this current plan     Other: Labs reviewed    RETURN TO CLINIC:  3 months    Rojelio Fink MD  [facility] PGYII  03/12/2024

## 2024-04-29 DIAGNOSIS — E78.5 HYPERLIPIDEMIA, UNSPECIFIED HYPERLIPIDEMIA TYPE: ICD-10-CM

## 2024-04-30 NOTE — TELEPHONE ENCOUNTER
Care Due:                  Date            Visit Type   Department     Provider  --------------------------------------------------------------------------------                                EP -                              PRIMARY      [facility] INTER[facility]  Last Visit: 12-      CARE ([facility])   MADAN Aleman                               -                              PRIMARY      [facility] INTER[facility]  Next Visit: 12-      CARE ([facility])   MADAN Aleman                                                            Last  Test          Frequency    Reason                     Performed    Due Date  --------------------------------------------------------------------------------    CMP.........  12 months..  atorvastatin.............  03- 03-    [employer] Embedded Care Due Messages. Reference number: [id number].   4/29/2024 8:23:47 PM CDT

## 2024-05-01 RX ORDER — ATORVASTATIN CALCIUM 20 MG/1
20 TABLET, FILM COATED ORAL
Qty: 90 TABLET | Refills: 2 | Status: SHIPPED | OUTPATIENT
Start: 2024-05-01

## 2024-05-01 RX ORDER — LOSARTAN POTASSIUM 25 MG/1
25 TABLET ORAL
Qty: 90 TABLET | Refills: 2 | Status: SHIPPED | OUTPATIENT
Start: 2024-05-01

## 2024-05-01 NOTE — TELEPHONE ENCOUNTER
Refill Routing Note   Medication(s) are not appropriate for processing by [facility] for the following reason(s):        Required vitals abnormal  Required labs outdated    [facility] action(s):  Defer     Requires labs : Yes             Appointments  past 12m or future 3m with PCP    Date Provider   Last Visit   12/29/2023 Randall Aleman MD   Next Visit   Visit date not found Randall Aleman MD   ED visits in past 90 days: 0        Note composed:9:31 AM 05/01/2024

## 2024-05-23 NOTE — PROGRESS NOTES
STAFF NOTE:  Patient's case was formally presented to me by Dr. Fink and patient was seen by me in supervision on 3/12/24.  I agree with the assessment and plan as specified.     Daniel Aparicio MD  Psychiatry

## 2024-06-10 ENCOUNTER — PATIENT MESSAGE (OUTPATIENT)
Dept: INTERNAL MEDICINE | Facility: CLINIC | Age: 84
End: 2024-06-10
Payer: MEDICARE

## 2024-06-14 DIAGNOSIS — F32.4 MAJOR DEPRESSIVE DISORDER WITH SINGLE EPISODE, IN PARTIAL REMISSION: ICD-10-CM

## 2024-06-14 DIAGNOSIS — F41.1 GAD (GENERALIZED ANXIETY DISORDER): ICD-10-CM

## 2024-06-14 NOTE — TELEPHONE ENCOUNTER
----- Message from Suzi Shepherd sent at 6/14/2024  4:02 PM CDT -----  Contact: [phone]  Requesting an RX refill or new RX.  Is this a refill or new RX:   RX name and strength (copy/paste from chart):  FLUoxetine 20 MG capsule 90 capsule  Is this a 30 day or 90 day RX: 90 day   Pharmacy name and phone # (copy/paste from chart):    [id number] [place], [place] - [address] AT [facility] [address]  [address]  [facility] [address]  Phone: [phone] Fax: [phone]      The doctors have asked that we provide their patients with the following 2 reminders -- prescription refills can take up to 72 hours, and a friendly reminder that in the future you can use your [facility] account to request refills: pt is aware                 Thank you

## 2024-06-14 NOTE — TELEPHONE ENCOUNTER
No care due was identified.  [facility] Embedded Care Due Messages. Reference number: [id number].   6/14/2024 4:41:34 PM CDT

## 2024-06-15 RX ORDER — FLUOXETINE HYDROCHLORIDE 20 MG/1
20 CAPSULE ORAL DAILY
Qty: 90 CAPSULE | Refills: 1 | Status: SHIPPED | OUTPATIENT
Start: 2024-06-15 | End: 2024-12-12

## 2024-06-15 NOTE — TELEPHONE ENCOUNTER
Refill Routing Note   Medication(s) are not appropriate for processing by [facility] for the following reason(s):        No active prescription written by provider    [facility] action(s):  Defer             Appointments  past 12m or future 3m with PCP    Date Provider   Last Visit   12/29/2023 Randall Aleman MD   Next Visit   Visit date not found Randall Aleman MD   ED visits in past 90 days: 0        Note composed:11:21 PM 06/14/2024

## 2024-06-24 ENCOUNTER — OFFICE VISIT (OUTPATIENT)
Dept: DERMATOLOGY | Facility: CLINIC | Age: 84
End: 2024-06-24
Payer: MEDICARE

## 2024-06-24 DIAGNOSIS — L81.4 LENTIGO: ICD-10-CM

## 2024-06-24 DIAGNOSIS — L73.8 SEBACEOUS HYPERPLASIA: ICD-10-CM

## 2024-06-24 DIAGNOSIS — D22.9 MULTIPLE BENIGN NEVI: ICD-10-CM

## 2024-06-24 DIAGNOSIS — D48.5 NEOPLASM OF UNCERTAIN BEHAVIOR OF SKIN: Primary | ICD-10-CM

## 2024-06-24 DIAGNOSIS — L82.1 SEBORRHEIC KERATOSES: ICD-10-CM

## 2024-06-24 DIAGNOSIS — L57.0 ACTINIC KERATOSIS: ICD-10-CM

## 2024-06-24 PROCEDURE — 88305 TISSUE EXAM BY PATHOLOGIST: CPT | Performed by: DERMATOLOGY

## 2024-06-24 PROCEDURE — 17003 DESTRUCT PREMALG LES 2-14: CPT | Mod: PBBFAC | Performed by: STUDENT IN AN ORGANIZED HEALTH CARE EDUCATION/TRAINING PROGRAM

## 2024-06-24 PROCEDURE — 17000 DESTRUCT PREMALG LESION: CPT | Mod: S$PBB,XS,, | Performed by: STUDENT IN AN ORGANIZED HEALTH CARE EDUCATION/TRAINING PROGRAM

## 2024-06-24 PROCEDURE — 11102 TANGNTL BX SKIN SINGLE LES: CPT | Mod: PBBFAC | Performed by: STUDENT IN AN ORGANIZED HEALTH CARE EDUCATION/TRAINING PROGRAM

## 2024-06-24 PROCEDURE — 88305 TISSUE EXAM BY PATHOLOGIST: CPT | Mod: 26,,, | Performed by: DERMATOLOGY

## 2024-06-24 PROCEDURE — 17003 DESTRUCT PREMALG LES 2-14: CPT | Mod: S$PBB,,, | Performed by: STUDENT IN AN ORGANIZED HEALTH CARE EDUCATION/TRAINING PROGRAM

## 2024-06-24 PROCEDURE — 11102 TANGNTL BX SKIN SINGLE LES: CPT | Mod: S$PBB,,, | Performed by: STUDENT IN AN ORGANIZED HEALTH CARE EDUCATION/TRAINING PROGRAM

## 2024-06-24 PROCEDURE — 99203 OFFICE O/P NEW LOW 30 MIN: CPT | Mod: 25,S$PBB,, | Performed by: STUDENT IN AN ORGANIZED HEALTH CARE EDUCATION/TRAINING PROGRAM

## 2024-06-24 PROCEDURE — 17000 DESTRUCT PREMALG LESION: CPT | Mod: 59,PBBFAC | Performed by: STUDENT IN AN ORGANIZED HEALTH CARE EDUCATION/TRAINING PROGRAM

## 2024-06-24 PROCEDURE — 99213 OFFICE O/P EST LOW 20 MIN: CPT | Mod: PBBFAC,25 | Performed by: STUDENT IN AN ORGANIZED HEALTH CARE EDUCATION/TRAINING PROGRAM

## 2024-06-24 PROCEDURE — 99999 PR PBB SHADOW E&M-EST. PATIENT-LVL III: CPT | Mod: PBBFAC,,, | Performed by: STUDENT IN AN ORGANIZED HEALTH CARE EDUCATION/TRAINING PROGRAM

## 2024-06-24 NOTE — PROGRESS NOTES
Subjective:      Patient ID:  Yao Burroughs is a 84 y.o. male who presents for   Chief Complaint   Patient presents with    Lesion     Pt here for growth on forehead     Pt denies personal h/o skin cancer     Patient with new are of concern:   Location: forehead  Previous treatments: none       Lesion      Review of Systems   Hematologic/Lymphatic: Does not bruise/bleed easily.       Objective:   Physical Exam   Constitutional: He appears well-developed and well-nourished.   Neurological: He is alert and oriented to person, place, and time.   Psychiatric: He has a normal mood and affect.   Skin:   Areas Examined (abnormalities noted in diagram):   Head / Face Inspection Performed            Diagram Legend     Erythematous scaling macule/papule c/w actinic keratosis       Vascular papule c/w angioma      Pigmented verrucoid papule/plaque c/w seborrheic keratosis      Yellow umbilicated papule c/w sebaceous hyperplasia      Irregularly shaped tan macule c/w lentigo     1-2 mm smooth white papules consistent with Milia      Movable subcutaneous cyst with punctum c/w epidermal inclusion cyst      Subcutaneous movable cyst c/w pilar cyst      Firm pink to brown papule c/w dermatofibroma      Pedunculated fleshy papule(s) c/w skin tag(s)      Evenly pigmented macule c/w junctional nevus     Mildly variegated pigmented, slightly irregular-bordered macule c/w mildly atypical nevus      Flesh colored to evenly pigmented papule c/w intradermal nevus       Pink pearly papule/plaque c/w basal cell carcinoma      Erythematous hyperkeratotic cursted plaque c/w SCC      Surgical scar with no sign of skin cancer recurrence      Open and closed comedones      Inflammatory papules and pustules      Verrucoid papule consistent consistent with wart     Erythematous eczematous patches and plaques     Dystrophic onycholytic nail with subungual debris c/w onychomycosis     Umbilicated papule    Erythematous-base heme-crusted tan  verrucoid plaque consistent with inflamed seborrheic keratosis     Erythematous Silvery Scaling Plaque c/w Psoriasis     See annotation            Assessment / Plan:      Pathology Orders:       Normal Orders This Visit    Specimen to Pathology, Dermatology     Questions:    Procedure Type: Dermatology and skin neoplasms    Number of Specimens: 1    ------------------------: -------------------------    Spec 1 Procedure: Biopsy    Spec 1 Clinical Impression: SK, AK, SCC    Spec 1 Source: upper mid forehead    Release to patient: Immediate    Release to patient:           Neoplasm of uncertain behavior of skin  -     Specimen to Pathology, Dermatology    Shave biopsy procedure note:  Shave biopsy performed after verbal consent including risk of infection, scar, recurrence, need for additional treatment of site. Area prepped with alcohol, anesthetized with approximately 1.0cc of 1% lidocaine with epinephrine. Lesional tissue shaved with razor blade. Hemostasis achieved with application of aluminum chloride followed by hyfrecation. No complications. Dressing applied. Wound care explained.      Actinic keratosis  Cryosurgery Procedure Note  Verbal consent from the patient is obtained including, but not limited to, risk of hypopigmentation/hyperpigmentation, scar, recurrence of lesion. The patient is aware of the precancerous quality and need for treatment of these lesions. Liquid nitrogen cryosurgery is applied to the 3 actinic keratoses, as detailed in the physical exam, to produce a freeze injury. The patient is aware that blisters may form and is instructed on wound care with gentle cleansing and use of vaseline ointment to keep moist until healed. The patient is supplied a handout on cryosurgery and is instructed to call if lesions do not completely resolve.    Lentigo  Seborrheic keratoses  Multiple benign nevi  Sebaceous hyperplasia  Reassurance given to patient. No treatment is necessary.     - Recommended the  use of daily sunscreen and counseled on its role as a preventative measure for photoaging, sunburns, and skin cancer and to prevent the worsening of photodermatoses and pigmentary disorders (eg, melasma and postinflammatory hyperpigmentation). Preferred products at least SPF 30 and are mineral based such as Elta MD tinted broad spectrum SPF 40, Neutrogenia Sheer Zinc SPF 50, CeraVe Tinted mineral SPF 30, Blue lizard mineral sunscreen, Sun Bum mineral sunscreen. Handout provided   - Also counseled on the use of photoprotective clothing, such as from Cool[employer]ar and Solumbra   - Avoiding peak sunlight hours from 10 am - 4 pm              Follow up in about 1 year (around 6/24/2025) for TBSE.

## 2024-06-26 LAB
FINAL PATHOLOGIC DIAGNOSIS: NORMAL
GROSS: NORMAL
Lab: NORMAL
MICROSCOPIC EXAM: NORMAL

## 2024-07-01 ENCOUNTER — TELEPHONE (OUTPATIENT)
Dept: INTERNAL MEDICINE | Facility: CLINIC | Age: 84
End: 2024-07-01
Payer: MEDICARE

## 2024-07-01 RX ORDER — EPINEPHRINE 0.3 MG/.3ML
INJECTION SUBCUTANEOUS
Qty: 1 EACH | Refills: 1 | Status: SHIPPED | OUTPATIENT
Start: 2024-07-01

## 2024-07-01 NOTE — TELEPHONE ENCOUNTER
----- Message from Chante Fraire sent at 7/1/2024  2:04 PM CDT -----  Contact: [phone] Patient  Requesting an RX refill or new RX.    Is this a refill or new RX: new    RX name and strength (copy/paste from chart):  EPINEPHrine (EPIPEN) 0.3 mg/0.3 mL AtIn    Is this a 30 day or 90 day RX:     Pharmacy name and phone # (copy/paste from chart):    [id number] - [place], [place] - [address] [facility] [address]  [address]  [facility] [address]  Phone: [phone] Fax: [phone]       The doctors have asked that we provide their patients with the following 2 reminders -- prescription refills can take up to 72 hours, and a friendly reminder that in the future you can use your [facility] account to request refills: yes

## 2024-07-09 ENCOUNTER — CLINICAL SUPPORT (OUTPATIENT)
Dept: PSYCHIATRY | Facility: CLINIC | Age: 84
End: 2024-07-09
Payer: MEDICARE

## 2024-07-09 VITALS
SYSTOLIC BLOOD PRESSURE: 131 MMHG | DIASTOLIC BLOOD PRESSURE: 61 MMHG | HEART RATE: 89 BPM | BODY MASS INDEX: 28.35 KG/M2 | WEIGHT: 203.25 LBS

## 2024-07-09 DIAGNOSIS — F33.0 MILD EPISODE OF RECURRENT MAJOR DEPRESSIVE DISORDER: Primary | ICD-10-CM

## 2024-07-09 DIAGNOSIS — F41.1 GAD (GENERALIZED ANXIETY DISORDER): ICD-10-CM

## 2024-07-09 PROCEDURE — 99212 OFFICE O/P EST SF 10 MIN: CPT | Mod: PBBFAC

## 2024-07-09 PROCEDURE — 99999 PR PBB SHADOW E&M-EST. PATIENT-LVL II: CPT | Mod: PBBFAC,,,

## 2024-07-09 RX ORDER — FLUOXETINE HYDROCHLORIDE 20 MG/1
40 CAPSULE ORAL DAILY
Qty: 60 CAPSULE | Refills: 2 | Status: SHIPPED | OUTPATIENT
Start: 2024-07-09

## 2024-07-09 RX ORDER — MIRTAZAPINE 30 MG/1
30 TABLET, FILM COATED ORAL NIGHTLY
Qty: 90 TABLET | Refills: 1 | Status: SHIPPED | OUTPATIENT
Start: 2024-07-09 | End: 2025-01-05

## 2024-07-09 NOTE — PROGRESS NOTES
"OUTPATIENT PSYCHIATRY FOLLOW UP VISIT    ENCOUNTER DATE:  7/9/2024  SITE:  [facility]  LENGTH OF SESSION:  50 minutes      CHIEF COMPLAINT:  depression    HISTORY OF PRESENTING ILLNESS:  Yao Burroughs is a 84 y.o. male with history of major depressive disorder and generalized anxiety disorder who presents for follow up appointment.      Plan at last appointment on 7/18/23  Continue mirtazepine 30 mg qhs  Continue fluoxetine 20 mg daily   Encouraged regular exercise and engagement with his community      Interval history as told by Patient -   Patient says he has been doing "ok" since last appointment. He reports he just recovered from having COVID, which was his first time kely the virus. Patient also received medical diagnosis of severe lumbar scoliosis with spondylosis. Due to these two medical conditions, he reports his mood has been depressed. He endorses having 2-3 days per week with thoughts of hopelessness and leads to negative self talk. Patient has been attempting to build more structure into his life as he adjusts to living in [place]. He has applied to work with the [employer] program, but has not heard back about any opportunities. Patient continues to engage in therapy with geriatric psychologist twice per month, which he finds beneficial. He has been seeing this therapist for about 1 year now. Patient has been having trouble with sleep initiation. He reports hearing "music" occasionally and is unsure if it is real. He denies experiencing any auditory hallucinations of voices similar to when he was hospitalized in [place]. He states the music he hears now is not distressing or functionally impairing. He denies any suicidal ideation, intent, or plan. Discussed sleep hygiene with patient and encouraged him to establish consistent night time routine. Discussed increasing dose of Fluoxetine, to target symptoms of anxiety and depression. Reviewed potential " "risks and benefits, to which patient expressed his understanding and agreement with this plan.     PSYCHIATRIC REVIEW OF SYSTEMS:(none/ yes- better/worse/stable/& what symptoms)    Symptoms of Depression: Mood has been "up and down", feels hopelessness 2-3 times per week. Some anhedonia. Denied any current suicidal ideation, intent, or plan.     Symptoms of Patty/Hypomania: denied     Symptoms of psychosis: denied AVH, sometimes hears noises that seems somewhat voice like but recognizes that they're not real. Continues to endorse hearing "music/instrumentals" but denies any distress or functional impairment    Sleep: problems with sleep initiation    Appetite: good    Other: denied tobacco    Alcohol: very rarely, glass of wine or two with dinner    Illicit Drugs: denied    Psychosocial stressors: adjustment to [place] and living independently    Risk Parameters:  Patient reports no suicidal ideation  Patient reports no homicidal ideation  Patient reports no self-injurious behavior  Patient reports no violent behavior    PSYCHIATRIC MED REVIEW    Current psych meds  Medication side effects:  denied  Medication compliance:  yes    Previous psych meds trials  Prozac, remeron    PAST PSYCHIATRIC, MEDICAL, AND SOCIAL HISTORY REVIEWED  The patient's past medical, family and social history have been reviewed and updated as appropriate within the electronic medical record - see encounter notes.    MEDICAL REVIEW OF SYSTEMS:  Complete review of systems performed covering Constitutional, Musculoskeletal, Neurologic.  All systems negative/ except none    ALL MEDICATIONS:    Current Outpatient Medications:     atorvastatin (LIPITOR) 20 MG tablet, TAKE 1 TABLET(20 MG) BY MOUTH EVERY DAY, Disp: 90 tablet, Rfl: 2    cholecalciferol, vitamin D3, (D3-2000 ORAL), Take by mouth., Disp: , Rfl:     cyanocobalamin, vitamin B-12, 1,000 mcg TbSR, Take by mouth., Disp: , Rfl:     EPINEPHrine (EPIPEN) 0.3 mg/0.3 mL AtIn, USE AS DIRECTED " "FOR ALLERGIC REACTIONS, Disp: 1 each, Rfl: 1    FLUoxetine 20 MG capsule, Take 1 capsule (20 mg total) by mouth once daily., Disp: 90 capsule, Rfl: 1    losartan (COZAAR) 25 MG tablet, TAKE 1 TABLET(25 MG) BY MOUTH EVERY DAY, Disp: 90 tablet, Rfl: 2    metoprolol tartrate (LOPRESSOR) 50 MG tablet, Take 1 tablet (50 mg total) by mouth 2 (two) times daily., Disp: 180 tablet, Rfl: 4    mirtazapine (REMERON) 30 MG tablet, Take 1 tablet (30 mg total) by mouth every evening., Disp: 90 tablet, Rfl: 1    tamsulosin (FLOMAX) 0.4 mg Cap, TAKE 1 CAPSULE(0.4 MG) BY MOUTH EVERY DAY, Disp: 90 capsule, Rfl: 3    ALLERGIES:  Review of patient's allergies indicates:   Allergen Reactions    Bee sting [allergen ext-venom-honey bee]        RELEVANT LABS/STUDIES:    Lab Results   Component Value Date    WBC 6.08 02/16/2024    HGB 14.4 02/16/2024    HCT 44.2 02/16/2024    MCV 96 02/16/2024     (L) 02/16/2024     BMP  Lab Results   Component Value Date     02/16/2024    K 4.5 02/16/2024     02/16/2024    CO2 23 02/16/2024    BUN 14 02/16/2024    CREATININE 1.1 02/16/2024    CALCIUM 9.5 02/16/2024    ANIONGAP 8 02/16/2024     Lab Results   Component Value Date    ALT 17 03/20/2023    AST 19 02/16/2024    ALKPHOS 69 03/20/2023    BILITOT 1.0 03/20/2023     Lab Results   Component Value Date    TSH 1.936 02/16/2024     No results found for: "LABA1C", "HGBA1C"    VITALS  Vitals:    07/09/24 0940   BP: 131/61   Pulse: 89   Weight: 92.2 kg (203 lb 4.2 oz)         PHYSICAL EXAM  General: well developed, well nourished  Neurologic:   Gait: Normal   Psychomotor signs:  No involuntary movements or tremor  AIMS: none    PSYCHIATRIC EXAM:     Mental Status Exam:  Appearance: unremarkable, age appropriate  Behavior/Cooperation: normal, cooperative  Speech: normal tone, normal rate, normal pitch, normal volume  Language: uses words appropriately; NO aphasia or dysarthria  Mood: "ok"  Affect: reactive, appropriate  Thought Process: " "normal and logical  Thought Content: normal, no suicidality, no homicidality, delusions, or paranoia  Level of Consciousness: Alert and Oriented x4  Memory:  Intact   Recent:  Intact; able to report recent events   Remote: intact, able to recall past events  Attention/concentration: appropriate for age/education.   Fund of Knowledge: appears adequate  Insight:  Intact, has awareness of illness   Judgment: Intact, behavior appropriate to circumstance       IMPRESSION:    Yoa Burroughs is a 84 y.o. male with history of major depressive disorder and generalized anxiety disorder who presents for follow up appointment.  He has had mild worsening of his anxiety and depression. Patient states he has desire to change his medication regimen at this time to "maximize his quality of life" while also engaging in counseling with both spiritual leader and geriatric psychologist.       Status/Progress:  Based on the examination today, the patient's problem(s) is/are not ideally controlled.  New problems have been presented today.     DIAGNOSES:    1. Major depressive disorder with single episode, in partial remission  F32.4 296.25   2. JYOTI (generalized anxiety disorder)  F41.1 300.02       PLAN:  Psych Med:  Continue Mirtazapine 30 mg PO Nightly  Increase Fluoxetine to 40 mg PO Daily, instructed patient to take increase dose slowly by taking 40 mg every other day for one week before increasing to 40 mg every day  Encouraged regular exercise and engagement with his community  Discussed with patient informed consent, risks vs. benefits, alternative treatments, side effect profile and the inherent unpredictability of individual responses to these treatments. Answered any questions patient may have had. The patient expresses understanding of the above and displays the capacity to agree with this current plan     Other: continue therapy with psychologist, currently twice per month    RETURN TO CLINIC:  3 months      David Castro, " MD [facility], PGY-4

## 2024-10-09 ENCOUNTER — CLINICAL SUPPORT (OUTPATIENT)
Dept: PSYCHIATRY | Facility: CLINIC | Age: 84
End: 2024-10-09
Payer: MEDICARE

## 2024-10-09 VITALS
BODY MASS INDEX: 28.61 KG/M2 | HEART RATE: 59 BPM | DIASTOLIC BLOOD PRESSURE: 69 MMHG | WEIGHT: 205.13 LBS | SYSTOLIC BLOOD PRESSURE: 134 MMHG

## 2024-10-09 DIAGNOSIS — F41.1 GAD (GENERALIZED ANXIETY DISORDER): ICD-10-CM

## 2024-10-09 PROCEDURE — 99212 OFFICE O/P EST SF 10 MIN: CPT | Mod: PBBFAC

## 2024-10-09 PROCEDURE — 99999 PR PBB SHADOW E&M-EST. PATIENT-LVL II: CPT | Mod: PBBFAC,,,

## 2024-10-09 RX ORDER — MIRTAZAPINE 30 MG/1
30 TABLET, FILM COATED ORAL NIGHTLY
Qty: 90 TABLET | Refills: 1 | Status: SHIPPED | OUTPATIENT
Start: 2024-10-09 | End: 2025-04-07

## 2024-10-09 RX ORDER — FLUOXETINE HYDROCHLORIDE 40 MG/1
40 CAPSULE ORAL DAILY
Qty: 90 CAPSULE | Refills: 1 | Status: SHIPPED | OUTPATIENT
Start: 2024-10-09

## 2024-10-09 NOTE — PROGRESS NOTES
"OUTPATIENT PSYCHIATRY FOLLOW UP VISIT    ENCOUNTER DATE:  10/9/2024  SITE:  [facility]  LENGTH OF SESSION:  50 minutes      CHIEF COMPLAINT:  depression    HISTORY OF PRESENTING ILLNESS:  Yao Burroughs is a 84 y.o. male with history of major depressive disorder and generalized anxiety disorder who presents for follow up appointment.      Plan at last appointment on 7/2024  Continue mirtazepine 30 mg qhs  Increase fluoxetine to 40 mg daily   Encouraged regular exercise and engagement with his community      Interval history as told by Patient -   Patient says he has been doing "ok" and "better" since last appointment. 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   Says he is generally feeling calm, "life is very simple." Still driving and caring for his own ADLs. Continues to have some trouble with sleep initiation. He reports hearing "music" or muffled "broadcasts" occasionally and is unsure if it is real or not. These are sometimes bothersome and make it difficlt to fall asleep. He says that if he puts on noise cancelling headphones, the noise goes away. Discussed with Dr Aparicio who explained the phenomenon of metal dental fillings picking up AM radio waves which may explain this noise and broadcast he is hearing. Patient indeed does have old metal fillings, and he says he will bring this up with his dentist. He denies ever hearing this music in any other situation other than when he is lying in bed. He does say that since increasing Prozac, his sleep has been much deeper and better quality, however. He does not feel he need a medication to help him fall asleep and would rather focus on sleep hygiene.  Today, patient rates mood lately as a 9/10. Denies persistent feelings of sadness or depression. Says that he no longer "feels lost" and that his "life has purpose." Denies any desire to hurt himself or others. He believes his current medications " "are working well and does not want to make any changes today. He denies any new medical problems other than starting with a chiropractor for arthritis. Denies any recent falls or changes in his memory.       PSYCHIATRIC REVIEW OF SYSTEMS:(none/ yes- better/worse/stable/& what symptoms)    Symptoms of Depression: Mood has been "up and down", feels hopelessness 2-3 times per week. Some anhedonia. Denied any current suicidal ideation, intent, or plan.     Symptoms of Patty/Hypomania: denied     Symptoms of psychosis: denied AVH, sometimes hears noises that seems somewhat voice like but recognizes that they're not real. Continues to endorse hearing "music/instrumentals" but denies any distress or functional impairment    Sleep: problems with sleep initiation    Appetite: good    Other: denied tobacco    Alcohol: very rarely, glass of wine or two with dinner    Illicit Drugs: denied    Psychosocial stressors: adjustment to [place] and living independently    Risk Parameters:  Patient reports no suicidal ideation  Patient reports no homicidal ideation  Patient reports no self-injurious behavior  Patient reports no violent behavior    PSYCHIATRIC MED REVIEW    Current psych meds  Medication side effects:  denied  Medication compliance:  yes    Previous psych meds trials  Prozac, remeron    PAST PSYCHIATRIC, MEDICAL, AND SOCIAL HISTORY REVIEWED  The patient's past medical, family and social history have been reviewed and updated as appropriate within the electronic medical record - see encounter notes.    MEDICAL REVIEW OF SYSTEMS:  Complete review of systems performed covering Constitutional, Musculoskeletal, Neurologic.  All systems negative/ except none    ALL MEDICATIONS:    Current Outpatient Medications:     atorvastatin (LIPITOR) 20 MG tablet, TAKE 1 TABLET(20 MG) BY MOUTH EVERY DAY, Disp: 90 tablet, Rfl: 2    cholecalciferol, vitamin D3, (D3-2000 ORAL), Take by mouth., Disp: , Rfl:     cyanocobalamin, vitamin " "B-12, 1,000 mcg TbSR, Take by mouth., Disp: , Rfl:     EPINEPHrine (EPIPEN) 0.3 mg/0.3 mL AtIn, USE AS DIRECTED FOR ALLERGIC REACTIONS, Disp: 1 each, Rfl: 1    FLUoxetine 20 MG capsule, Take 2 capsules (40 mg total) by mouth once daily., Disp: 60 capsule, Rfl: 2    losartan (COZAAR) 25 MG tablet, TAKE 1 TABLET(25 MG) BY MOUTH EVERY DAY, Disp: 90 tablet, Rfl: 2    metoprolol tartrate (LOPRESSOR) 50 MG tablet, Take 1 tablet (50 mg total) by mouth 2 (two) times daily., Disp: 180 tablet, Rfl: 4    mirtazapine (REMERON) 30 MG tablet, Take 1 tablet (30 mg total) by mouth every evening., Disp: 90 tablet, Rfl: 1    tamsulosin (FLOMAX) 0.4 mg Cap, TAKE 1 CAPSULE(0.4 MG) BY MOUTH EVERY DAY, Disp: 90 capsule, Rfl: 3    ALLERGIES:  Review of patient's allergies indicates:   Allergen Reactions    Bee sting [allergen ext-venom-honey bee]        RELEVANT LABS/STUDIES:    Lab Results   Component Value Date    WBC 6.08 02/16/2024    HGB 14.4 02/16/2024    HCT 44.2 02/16/2024    MCV 96 02/16/2024     (L) 02/16/2024     BMP  Lab Results   Component Value Date     02/16/2024    K 4.5 02/16/2024     02/16/2024    CO2 23 02/16/2024    BUN 14 02/16/2024    CREATININE 1.1 02/16/2024    CALCIUM 9.5 02/16/2024    ANIONGAP 8 02/16/2024     Lab Results   Component Value Date    ALT 17 03/20/2023    AST 19 02/16/2024    ALKPHOS 69 03/20/2023    BILITOT 1.0 03/20/2023     Lab Results   Component Value Date    TSH 1.936 02/16/2024     No results found for: "LABA1C", "HGBA1C"    VITALS  Vitals:    10/09/24 0946   BP: 134/69   Pulse: (!) 59   Weight: 93.1 kg (205 lb 2.2 oz)         PHYSICAL EXAM  General: well developed, well nourished  Neurologic:   Gait: Normal   Psychomotor signs:  No involuntary movements or tremor  AIMS: none    PSYCHIATRIC EXAM:     Mental Status Exam:  Appearance: unremarkable, age appropriate  Behavior/Cooperation: normal, cooperative  Speech: normal tone, normal rate, normal pitch, normal " "volume  Language: uses words appropriately; NO aphasia or dysarthria  Mood: "ok"  Affect: reactive, appropriate  Thought Process: normal and logical  Thought Content: normal, no suicidality, no homicidality, delusions, or paranoia  Level of Consciousness: Alert and Oriented x4  Memory:  Intact   Recent:  Intact; able to report recent events   Remote: intact, able to recall past events  Attention/concentration: appropriate for age/education.   Fund of Knowledge: appears adequate  Insight:  Intact, has awareness of illness   Judgment: Intact, behavior appropriate to circumstance       IMPRESSION:    Yao Burroughs is a 84 y.o. male with history of major depressive disorder and generalized anxiety disorder who presents for follow up appointment.  He has had mild worsening of his anxiety and depression. Patient states he has desire to change his medication regimen at this time to "maximize his quality of life" while also engaging in counseling with both spiritual leader and geriatric psychologist.       Status/Progress:  Based on the examination today, the patient's problem(s) is/are not ideally controlled.  New problems have been presented today.     DIAGNOSES:    1. Major depressive disorder with single episode, in partial remission  F32.4 296.25   2. JYOTI (generalized anxiety disorder)  F41.1 300.02       PLAN:  Psych Med:  Continue Mirtazapine 30 mg PO Nightly  Continue Fluoxetine to 40 mg PO Daily  Encouraged regular exercise and engagement with his community  Discussed with patient informed consent, risks vs. benefits, alternative treatments, side effect profile and the inherent unpredictability of individual responses to these treatments. Answered any questions patient may have had. The patient expresses understanding of the above and displays the capacity to agree with this current plan     Other: continue therapy with psychologist, currently twice per month    RETURN TO CLINIC:  6 months      Anastasiya Hahn, " MD [facility], PGY-4

## 2024-11-26 ENCOUNTER — PATIENT MESSAGE (OUTPATIENT)
Dept: ADMINISTRATIVE | Facility: HOSPITAL | Age: 84
End: 2024-11-26
Payer: MEDICARE

## 2024-12-06 ENCOUNTER — IMMUNIZATION (OUTPATIENT)
Dept: INTERNAL MEDICINE | Facility: CLINIC | Age: 84
End: 2024-12-06
Payer: MEDICARE

## 2024-12-06 DIAGNOSIS — Z23 NEED FOR VACCINATION: Primary | ICD-10-CM

## 2024-12-17 DIAGNOSIS — E78.5 HYPERLIPIDEMIA, UNSPECIFIED HYPERLIPIDEMIA TYPE: ICD-10-CM

## 2024-12-18 RX ORDER — ATORVASTATIN CALCIUM 20 MG/1
20 TABLET, FILM COATED ORAL
Qty: 90 TABLET | Refills: 0 | Status: SHIPPED | OUTPATIENT
Start: 2024-12-18

## 2024-12-18 NOTE — TELEPHONE ENCOUNTER
Refill Routing Note   Medication(s) are not appropriate for processing by [facility] for the following reason(s):        Required labs outdated    [facility] action(s):  Defer               Appointments  past 12m or future 3m with PCP    Date Provider   Last Visit   12/29/2023 Randall Aleman MD   Next Visit   12/30/2024 Randall Aleman MD   ED visits in past 90 days: 0        Note composed:10:20 PM 12/17/2024

## 2024-12-30 ENCOUNTER — LAB VISIT (OUTPATIENT)
Dept: LAB | Facility: HOSPITAL | Age: 84
End: 2024-12-30
Attending: INTERNAL MEDICINE
Payer: MEDICARE

## 2024-12-30 ENCOUNTER — OFFICE VISIT (OUTPATIENT)
Dept: INTERNAL MEDICINE | Facility: CLINIC | Age: 84
End: 2024-12-30
Payer: MEDICARE

## 2024-12-30 VITALS
HEIGHT: 71 IN | BODY MASS INDEX: 29.6 KG/M2 | SYSTOLIC BLOOD PRESSURE: 128 MMHG | WEIGHT: 211.44 LBS | HEART RATE: 65 BPM | DIASTOLIC BLOOD PRESSURE: 82 MMHG | OXYGEN SATURATION: 95 %

## 2024-12-30 DIAGNOSIS — F29 PSYCHOSIS, UNSPECIFIED PSYCHOSIS TYPE: ICD-10-CM

## 2024-12-30 DIAGNOSIS — I10 PRIMARY HYPERTENSION: Primary | ICD-10-CM

## 2024-12-30 DIAGNOSIS — R35.0 BENIGN PROSTATIC HYPERPLASIA WITH URINARY FREQUENCY: ICD-10-CM

## 2024-12-30 DIAGNOSIS — N40.1 BENIGN PROSTATIC HYPERPLASIA WITH URINARY FREQUENCY: ICD-10-CM

## 2024-12-30 DIAGNOSIS — R73.09 ELEVATED GLUCOSE LEVEL: ICD-10-CM

## 2024-12-30 DIAGNOSIS — E78.5 HYPERLIPIDEMIA, UNSPECIFIED HYPERLIPIDEMIA TYPE: ICD-10-CM

## 2024-12-30 LAB
ALBUMIN SERPL BCP-MCNC: 3.9 G/DL (ref 3.5–5.2)
ALP SERPL-CCNC: 85 U/L (ref 40–150)
ALT SERPL W/O P-5'-P-CCNC: 21 U/L (ref 10–44)
ANION GAP SERPL CALC-SCNC: 6 MMOL/L (ref 8–16)
AST SERPL-CCNC: 25 U/L (ref 10–40)
BASOPHILS # BLD AUTO: 0.04 K/UL (ref 0–0.2)
BASOPHILS NFR BLD: 0.6 % (ref 0–1.9)
BILIRUB SERPL-MCNC: 0.9 MG/DL (ref 0.1–1)
BUN SERPL-MCNC: 15 MG/DL (ref 8–23)
CALCIUM SERPL-MCNC: 9.2 MG/DL (ref 8.7–10.5)
CHLORIDE SERPL-SCNC: 108 MMOL/L (ref 95–110)
CHOLEST SERPL-MCNC: 127 MG/DL (ref 120–199)
CHOLEST/HDLC SERPL: 2.9 {RATIO} (ref 2–5)
CO2 SERPL-SCNC: 26 MMOL/L (ref 23–29)
CREAT SERPL-MCNC: 0.9 MG/DL (ref 0.5–1.4)
DIFFERENTIAL METHOD BLD: ABNORMAL
EOSINOPHIL # BLD AUTO: 0.2 K/UL (ref 0–0.5)
EOSINOPHIL NFR BLD: 3.1 % (ref 0–8)
ERYTHROCYTE [DISTWIDTH] IN BLOOD BY AUTOMATED COUNT: 13.2 % (ref 11.5–14.5)
EST. GFR  (NO RACE VARIABLE): >60 ML/MIN/1.73 M^2
ESTIMATED AVG GLUCOSE: 114 MG/DL (ref 68–131)
GLUCOSE SERPL-MCNC: 93 MG/DL (ref 70–110)
HBA1C MFR BLD: 5.6 % (ref 4–5.6)
HCT VFR BLD AUTO: 42.2 % (ref 40–54)
HDLC SERPL-MCNC: 44 MG/DL (ref 40–75)
HDLC SERPL: 34.6 % (ref 20–50)
HGB BLD-MCNC: 13.6 G/DL (ref 14–18)
IMM GRANULOCYTES # BLD AUTO: 0.02 K/UL (ref 0–0.04)
IMM GRANULOCYTES NFR BLD AUTO: 0.3 % (ref 0–0.5)
LDLC SERPL CALC-MCNC: 66.8 MG/DL (ref 63–159)
LYMPHOCYTES # BLD AUTO: 2.3 K/UL (ref 1–4.8)
LYMPHOCYTES NFR BLD: 33.6 % (ref 18–48)
MCH RBC QN AUTO: 30.9 PG (ref 27–31)
MCHC RBC AUTO-ENTMCNC: 32.2 G/DL (ref 32–36)
MCV RBC AUTO: 96 FL (ref 82–98)
MONOCYTES # BLD AUTO: 0.9 K/UL (ref 0.3–1)
MONOCYTES NFR BLD: 13.7 % (ref 4–15)
NEUTROPHILS # BLD AUTO: 3.4 K/UL (ref 1.8–7.7)
NEUTROPHILS NFR BLD: 48.7 % (ref 38–73)
NONHDLC SERPL-MCNC: 83 MG/DL
NRBC BLD-RTO: 0 /100 WBC
PLATELET # BLD AUTO: 120 K/UL (ref 150–450)
PMV BLD AUTO: 10.7 FL (ref 9.2–12.9)
POTASSIUM SERPL-SCNC: 4.4 MMOL/L (ref 3.5–5.1)
PROT SERPL-MCNC: 7.3 G/DL (ref 6–8.4)
RBC # BLD AUTO: 4.4 M/UL (ref 4.6–6.2)
SODIUM SERPL-SCNC: 140 MMOL/L (ref 136–145)
TRIGL SERPL-MCNC: 81 MG/DL (ref 30–150)
WBC # BLD AUTO: 6.88 K/UL (ref 3.9–12.7)

## 2024-12-30 PROCEDURE — 99214 OFFICE O/P EST MOD 30 MIN: CPT | Mod: S$PBB,,, | Performed by: INTERNAL MEDICINE

## 2024-12-30 PROCEDURE — 36415 COLL VENOUS BLD VENIPUNCTURE: CPT | Performed by: INTERNAL MEDICINE

## 2024-12-30 PROCEDURE — 99214 OFFICE O/P EST MOD 30 MIN: CPT | Mod: PBBFAC | Performed by: INTERNAL MEDICINE

## 2024-12-30 PROCEDURE — 99999 PR PBB SHADOW E&M-EST. PATIENT-LVL IV: CPT | Mod: PBBFAC,,, | Performed by: INTERNAL MEDICINE

## 2024-12-30 PROCEDURE — 83036 HEMOGLOBIN GLYCOSYLATED A1C: CPT | Performed by: INTERNAL MEDICINE

## 2024-12-30 PROCEDURE — G2211 COMPLEX E/M VISIT ADD ON: HCPCS | Mod: S$PBB,,, | Performed by: INTERNAL MEDICINE

## 2024-12-30 PROCEDURE — 85025 COMPLETE CBC W/AUTO DIFF WBC: CPT | Performed by: INTERNAL MEDICINE

## 2024-12-30 PROCEDURE — 80053 COMPREHEN METABOLIC PANEL: CPT | Performed by: INTERNAL MEDICINE

## 2024-12-30 PROCEDURE — 80061 LIPID PANEL: CPT | Performed by: INTERNAL MEDICINE

## 2024-12-30 RX ORDER — ATORVASTATIN CALCIUM 20 MG/1
20 TABLET, FILM COATED ORAL DAILY
Qty: 90 TABLET | Refills: 4 | Status: SHIPPED | OUTPATIENT
Start: 2024-12-30

## 2024-12-30 RX ORDER — LOSARTAN POTASSIUM 25 MG/1
25 TABLET ORAL DAILY
Qty: 90 TABLET | Refills: 4 | Status: SHIPPED | OUTPATIENT
Start: 2024-12-30

## 2024-12-30 RX ORDER — METOPROLOL TARTRATE 50 MG/1
50 TABLET ORAL 2 TIMES DAILY
Qty: 180 TABLET | Refills: 4 | Status: SHIPPED | OUTPATIENT
Start: 2024-12-30

## 2024-12-30 RX ORDER — TAMSULOSIN HYDROCHLORIDE 0.4 MG/1
0.4 CAPSULE ORAL DAILY
Qty: 90 CAPSULE | Refills: 4 | Status: SHIPPED | OUTPATIENT
Start: 2024-12-30

## 2024-12-30 NOTE — PROGRESS NOTES
HPI  History of Present Illness    CHIEF COMPLAINT:  Annual wellness visit and discussion of ongoing medical issues, including tinnitus and spinal arthritis.    HPI:  Yoa is an 84-year-old male with ongoing tinnitus. He has been attempting to address these auditory sensations and consulted with Dr. Daniel Aparicio, a psychiatrist at the geriatric clinic, with a follow-up visit scheduled in April. Dr. Aparicio suggested investigating the possibility of metal in the patient's teeth as a potential cause of auditory phenomena. Yao confirms having multiple metal dental fillings.  He was diagnosed with psychotic episodes but not psychosis.  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  We talked about his spinal arthritis and he continues to periodically see a chiropractor    Yao has been taking cannabidiol (CBD) gummies for approximately 2 weeks to address tinnitus, as recommended by an acquaintance. While uncertain of their efficacy, his condition has progressively improved over the past 6 months relative to the auditory disturbances.    Yao has a curved and arthritic spinal column and has been receiving chiropractic treatment to maintain stability. X-rays of his back, hips, and knees were taken during a chiropractic visit. He reports fatigue after prolonged periods of standing and cooking, which he attributes to his age and condition.    Yao mentions a prior hospitalization where he had hallucinations, which medical professionals characterized as psychotic episodes rather than psychosis. He continues to have auditory phenomena, which he is actively attempting to address.    In the past year, the patient consulted a dermatologist who removed several growths from his face, including one on the right temple and two slightly posterior. The  dermatologist also applied cryotherapy to 3 keratoses. All removed growths were reported as benign.    Yao also reports shoulder pain, potentially attributed to arthritis.    MEDICATIONS:  Yao is on Atorvastatin and blood pressure medication. He is also taking Flomax and Atraboson. For tinnitus management, he uses CBD gummies (10 mg) and CBD tincture (10 mg) sublingually.    MEDICAL HISTORY:  Yao has a history of spinal arthritis, tinnitus, and psychotic episodes.    IMAGING:  Yao underwent X-rays of his back, hips, and knees.    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      ROS:  General: +fatigue  ENT: +tinnitus  Musculoskeletal: -joint pain  Psychiatric: -hallucinations             Review of Systems   Respiratory:  Negative for shortness of breath.    Cardiovascular:  Negative for chest pain and leg swelling.   Gastrointestinal:  Negative for abdominal pain.   Psychiatric/Behavioral:  Positive for hallucinations (occasional auditory). The patient is nervous/anxious (clinically stable).        Past Medical History:   Diagnosis Date    Anxiety disorder, unspecified     Auditory hallucinations     Depression     Psychosis      No past surgical history on file.   Patient Active Problem List   Diagnosis    Primary hypertension    Benign prostatic hyperplasia with urinary frequency    JYOTI (generalized anxiety disorder)    Depression    Psychosis    Auditory hallucinations          Objective:      Physical Exam    Musculoskeletal: Pain on the shoulder.       Physical Exam  Constitutional:       General: He is not in acute distress.     Appearance: He is well-developed.   HENT:      Head: Normocephalic and atraumatic.      Right Ear: Tympanic membrane, ear canal and external ear normal.      Left Ear: Tympanic membrane, ear canal and external ear normal.      Mouth/Throat:      Pharynx: No oropharyngeal exudate or posterior  oropharyngeal erythema.   Eyes:      General: No scleral icterus.     Conjunctiva/sclera: Conjunctivae normal.      Pupils: Pupils are equal, round, and reactive to light.   Neck:      Thyroid: No thyromegaly.      Comments: No supraclavicular nodes palpated  Cardiovascular:      Rate and Rhythm: Normal rate and regular rhythm.      Pulses: Normal pulses.      Heart sounds: Normal heart sounds. No murmur heard.  Pulmonary:      Effort: Pulmonary effort is normal.      Breath sounds: Normal breath sounds. No wheezing.   Abdominal:      General: Bowel sounds are normal.      Palpations: Abdomen is soft. There is no mass.      Tenderness: There is no abdominal tenderness.   Musculoskeletal:         General: No tenderness.      Cervical back: Normal range of motion and neck supple.      Right lower leg: No edema.      Left lower leg: No edema.   Lymphadenopathy:      Cervical: No cervical adenopathy.   Skin:     Coloration: Skin is not jaundiced or pale.   Neurological:      General: No focal deficit present.      Mental Status: He is alert and oriented to person, place, and time.   Psychiatric:         Mood and Affect: Mood normal.         Behavior: Behavior normal.           Assessment:       Problem List Items Addressed This Visit          Psychiatric    Psychosis    Relevant Orders    CBC Auto Differential    Comprehensive Metabolic Panel    Hemoglobin A1C    Lipid Panel       Cardiac/Vascular    Primary hypertension - Primary       Renal/    Benign prostatic hyperplasia with urinary frequency    Relevant Medications    tamsulosin (FLOMAX) 0.4 mg Cap    Other Relevant Orders    CBC Auto Differential    Comprehensive Metabolic Panel    Hemoglobin A1C    Lipid Panel     Other Visit Diagnoses       Hyperlipidemia, unspecified hyperlipidemia type        Relevant Medications    atorvastatin (LIPITOR) 20 MG tablet    Other Relevant Orders    CBC Auto Differential    Comprehensive Metabolic Panel    Hemoglobin A1C     "Lipid Panel    Elevated glucose level        Relevant Orders    Hemoglobin A1C            Plan:       Yao was seen today for annual exam.    Diagnoses and all orders for this visit:    Primary hypertension    Psychosis, unspecified psychosis type  Comments:  Continue to treat with Fluoxetine and Remeron. Clinically stable.  Orders:  -     CBC Auto Differential; Future  -     Comprehensive Metabolic Panel; Future  -     Hemoglobin A1C; Future  -     Lipid Panel; Future    Hyperlipidemia, unspecified hyperlipidemia type  -     atorvastatin (LIPITOR) 20 MG tablet; Take 1 tablet (20 mg total) by mouth once daily.  -     CBC Auto Differential; Future  -     Comprehensive Metabolic Panel; Future  -     Hemoglobin A1C; Future  -     Lipid Panel; Future    Benign prostatic hyperplasia with urinary frequency  -     tamsulosin (FLOMAX) 0.4 mg Cap; Take 1 capsule (0.4 mg total) by mouth once daily.  -     CBC Auto Differential; Future  -     Comprehensive Metabolic Panel; Future  -     Hemoglobin A1C; Future  -     Lipid Panel; Future    Elevated glucose level  -     Hemoglobin A1C; Future    Other orders  -     losartan (COZAAR) 25 MG tablet; Take 1 tablet (25 mg total) by mouth once daily.  -     metoprolol tartrate (LOPRESSOR) 50 MG tablet; Take 1 tablet (50 mg total) by mouth 2 (two) times daily.       Continue meds, review labs.  Continue annual follow-up    As this patient's PCP, I am actively managing and/or treating their chronic medical conditions including HTN, Hyperlipidemia and have been for at least 1 year. This includes, but is not limited to, medication management, coordination of care, documentation review from their specialists and labs/imaging review where pertinent.        Portions of this note may have been created with [employer] voice recognition software. Occasional "wrong-word" or "sound-a-like" substitutions may have occurred due to the inherent limitations of voice recognition software. Please, " read the note carefully and recognize, using context, where substitutions have occurred.

## 2024-12-31 ENCOUNTER — PATIENT MESSAGE (OUTPATIENT)
Dept: INTERNAL MEDICINE | Facility: CLINIC | Age: 84
End: 2024-12-31
Payer: MEDICARE

## 2024-12-31 DIAGNOSIS — D64.9 ANEMIA, UNSPECIFIED TYPE: Primary | ICD-10-CM

## 2025-02-09 NOTE — TELEPHONE ENCOUNTER
No care due was identified.  [facility] Embedded Care Due Messages. Reference number: [id number].   2/09/2025 11:35:43 AM CST

## 2025-02-10 RX ORDER — LOSARTAN POTASSIUM 25 MG/1
25 TABLET ORAL
Qty: 90 TABLET | Refills: 3 | Status: SHIPPED | OUTPATIENT
Start: 2025-02-10

## 2025-02-10 NOTE — TELEPHONE ENCOUNTER
Refill Decision Note   Yao Burroughs  is requesting a refill authorization.  Brief Assessment and Rationale for Refill:  Approve     Medication Therapy Plan:         Comments:     Note composed:8:28 AM 02/10/2025

## 2025-03-10 NOTE — TELEPHONE ENCOUNTER
No care due was identified.  Health [facility] Embedded Care Due Messages. Reference number: [id number].   3/10/2025 10:15:47 AM CDT

## 2025-03-11 RX ORDER — METOPROLOL TARTRATE 50 MG/1
50 TABLET ORAL 2 TIMES DAILY
Qty: 180 TABLET | Refills: 3 | Status: SHIPPED | OUTPATIENT
Start: 2025-03-11

## 2025-03-11 NOTE — TELEPHONE ENCOUNTER
Refill Decision Note   Yao Burroughs  is requesting a refill authorization.  Brief Assessment and Rationale for Refill:  Approve     Medication Therapy Plan:         Comments:     Note composed:5:55 AM 03/11/2025

## 2025-04-04 ENCOUNTER — TELEPHONE (OUTPATIENT)
Dept: INTERNAL MEDICINE | Facility: CLINIC | Age: 85
End: 2025-04-04
Payer: MEDICARE

## 2025-04-04 NOTE — TELEPHONE ENCOUNTER
----- Message from Su sent at 4/4/2025  3:31 PM CDT -----  Contact: [phone]  Symptom: Shoulder Pain - Not From InjuryOutcome: Schedule an appointment to be seen within 24 hours.Reason: Caller denied all higher acuity questions.1MEDICALADVICE Patient is calling for Medical Advice regarding:pt is calling he is having right shoulder pain and he wants to know if he should go to the ortho dr first if so he needs a referral or do he came and see Dr. Aleman.  Please call pt back and advise.How long has patient had these symptoms:Pharmacy name and phone#:Patient wants a call back or thru [facility]:callbackComments:Please advise patient replies from provider may take up to 48 hours.

## 2025-04-09 ENCOUNTER — CLINICAL SUPPORT (OUTPATIENT)
Dept: PSYCHIATRY | Facility: CLINIC | Age: 85
End: 2025-04-09
Payer: MEDICARE

## 2025-04-09 VITALS
BODY MASS INDEX: 29.72 KG/M2 | SYSTOLIC BLOOD PRESSURE: 149 MMHG | DIASTOLIC BLOOD PRESSURE: 89 MMHG | HEART RATE: 78 BPM | WEIGHT: 213.06 LBS

## 2025-04-09 DIAGNOSIS — F41.1 GAD (GENERALIZED ANXIETY DISORDER): ICD-10-CM

## 2025-04-09 PROCEDURE — 99999 PR PBB SHADOW E&M-EST. PATIENT-LVL II: CPT | Mod: PBBFAC,,,

## 2025-04-09 PROCEDURE — 99212 OFFICE O/P EST SF 10 MIN: CPT | Mod: PBBFAC

## 2025-04-09 RX ORDER — FLUOXETINE HYDROCHLORIDE 40 MG/1
40 CAPSULE ORAL DAILY
Qty: 90 CAPSULE | Refills: 1 | Status: SHIPPED | OUTPATIENT
Start: 2025-04-09

## 2025-04-09 RX ORDER — MIRTAZAPINE 30 MG/1
30 TABLET, FILM COATED ORAL NIGHTLY
Qty: 90 TABLET | Refills: 1 | Status: SHIPPED | OUTPATIENT
Start: 2025-04-09 | End: 2025-10-06

## 2025-04-09 NOTE — PROGRESS NOTES
"OUTPATIENT PSYCHIATRY FOLLOW UP VISIT    ENCOUNTER DATE:  4/9/2025  SITE:  [facility]  LENGTH OF SESSION:  50 minutes      CHIEF COMPLAINT:  depression    HISTORY OF PRESENTING ILLNESS:  Yao Burroughs is a 84 y.o. male with history of major depressive disorder and generalized anxiety disorder who presents for follow up appointment.        Interval history as told by Patient -   Patient says he has been doing "pretty good" since last appointment. 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     Pt is oriented to person, place, time, situation. He has a euthymic affect and is very happy with his "simple life" He does endorse hearing humming and thumping noises "like a radio". His dentist confirms that he has old metal fillings. Discussed with pt that this could be cause of these noises as AM radio waves have been known to transmit through metal fillings in teeth. Pt reports he is sleeping and eating well. Would like to continue on current medication regimen. He has been going to therapy focusing on his aging and life. He enjoys doing so and will continue. Denies SI, HI, AVH.       PSYCHIATRIC REVIEW OF SYSTEMS:(none/ yes- better/worse/stable/& what symptoms)    Symptoms of Depression: Denied any current suicidal ideation, intent, or plan.     Symptoms of Patty/Hypomania: denied     Symptoms of psychosis: . Continues to endorse hearing "music/instrumentals" but denies any distress or functional impairment    Sleep: good    Appetite: good    Other: denied tobacco    Alcohol: very rarely, glass of wine or two with dinner    Illicit Drugs: denied    Psychosocial stressors: adjustment to [place] and living independently    Risk Parameters:  Patient reports no suicidal ideation  Patient reports no homicidal ideation  Patient reports no self-injurious behavior  Patient reports no violent behavior    PSYCHIATRIC MED REVIEW    Current " psych meds  Medication side effects:  denied  Medication compliance:  yes    Previous psych meds trials  Prozac, remeron    PAST PSYCHIATRIC, MEDICAL, AND SOCIAL HISTORY REVIEWED  The patient's past medical, family and social history have been reviewed and updated as appropriate within the electronic medical record - see encounter notes.    MEDICAL REVIEW OF SYSTEMS:  Complete review of systems performed covering Constitutional, Musculoskeletal, Neurologic.  All systems negative/ except none    ALL MEDICATIONS:    Current Outpatient Medications:     atorvastatin (LIPITOR) 20 MG tablet, Take 1 tablet (20 mg total) by mouth once daily., Disp: 90 tablet, Rfl: 4    cannabidiol, CBD, (CANNABIDIOL ORAL), Take by mouth., Disp: , Rfl:     cholecalciferol, vitamin D3, (D3-2000 ORAL), Take by mouth., Disp: , Rfl:     cyanocobalamin, vitamin B-12, 1,000 mcg TbSR, Take by mouth., Disp: , Rfl:     EPINEPHrine (EPIPEN) 0.3 mg/0.3 mL AtIn, USE AS DIRECTED FOR ALLERGIC REACTIONS, Disp: 1 each, Rfl: 1    FLUoxetine 40 MG capsule, Take 1 capsule (40 mg total) by mouth once daily., Disp: 90 capsule, Rfl: 1    losartan (COZAAR) 25 MG tablet, TAKE 1 TABLET(25 MG) BY MOUTH EVERY DAY, Disp: 90 tablet, Rfl: 3    metoprolol tartrate (LOPRESSOR) 50 MG tablet, TAKE 1 TABLET(50 MG) BY MOUTH TWICE DAILY, Disp: 180 tablet, Rfl: 3    mirtazapine (REMERON) 30 MG tablet, Take 1 tablet (30 mg total) by mouth every evening., Disp: 90 tablet, Rfl: 1    tamsulosin (FLOMAX) 0.4 mg Cap, Take 1 capsule (0.4 mg total) by mouth once daily., Disp: 90 capsule, Rfl: 4    ALLERGIES:  Review of patient's allergies indicates:   Allergen Reactions    Bee sting [allergen ext-venom-honey bee]        RELEVANT LABS/STUDIES:    Lab Results   Component Value Date    WBC 6.88 12/30/2024    HGB 13.6 (L) 12/30/2024    HCT 42.2 12/30/2024    MCV 96 12/30/2024     (L) 12/30/2024     BMP  Lab Results   Component Value Date     12/30/2024    K 4.4 12/30/2024    CL  "108 12/30/2024    CO2 26 12/30/2024    BUN 15 12/30/2024    CREATININE 0.9 12/30/2024    CALCIUM 9.2 12/30/2024    ANIONGAP 6 (L) 12/30/2024     Lab Results   Component Value Date    ALT 21 12/30/2024    AST 25 12/30/2024    ALKPHOS 85 12/30/2024    BILITOT 0.9 12/30/2024     Lab Results   Component Value Date    TSH 1.936 02/16/2024     Lab Results   Component Value Date    HGBA1C 5.6 12/30/2024       VITALS  Vitals:    04/09/25 0957   BP: (!) 149/89   Pulse: 78   Weight: 96.6 kg (213 lb 1.2 oz)         PHYSICAL EXAM  General: well developed, well nourished  Neurologic:   Gait: Normal   Psychomotor signs:  No involuntary movements or tremor  AIMS: none    PSYCHIATRIC EXAM:     Mental Status Exam:  Appearance: unremarkable, age appropriate  Behavior/Cooperation: normal, cooperative  Speech: normal tone, normal rate, normal pitch, normal volume  Language: uses words appropriately; NO aphasia or dysarthria  Mood: "pretty good"  Affect: reactive, appropriate, euthymic   Thought Process: normal and logical  Thought Content: normal, no suicidality, no homicidality, delusions, or paranoia  Level of Consciousness: Alert and Oriented x4  Memory:  Intact   Recent:  Intact; able to report recent events   Remote: intact, able to recall past events  Attention/concentration: appropriate for age/education.   Fund of Knowledge: appears adequate  Insight:  Intact, has awareness of illness   Judgment: Intact, behavior appropriate to circumstance       IMPRESSION:    Yao Burroughs is a 84 y.o. male with history of major depressive disorder and generalized anxiety disorder who presents for follow up appointment.  He is doing well and would like to continue current medication regimen.       Status/Progress:  Based on the examination today, the patient's problem(s) is/are ideally controlled.     DIAGNOSES:    1. Major depressive disorder with single episode, in partial remission  F32.4 296.25   2. JYOTI (generalized anxiety disorder)  " F41.1 300.02       PLAN:  Psych Med:  Continue Mirtazapine 30 mg PO Nightly  Continue Fluoxetine to 40 mg PO Daily  Encouraged regular exercise and engagement with his community  Discussed with patient informed consent, risks vs. benefits, alternative treatments, side effect profile and the inherent unpredictability of individual responses to these treatments. Answered any questions patient may have had. The patient expresses understanding of the above and displays the capacity to agree with this current plan     Other: continue therapy with psychologist, currently twice per month    RETURN TO CLINIC:  6 months      William Sistrunk, MD  [facility], PGY-2

## 2025-04-11 ENCOUNTER — HOSPITAL ENCOUNTER (OUTPATIENT)
Dept: RADIOLOGY | Facility: HOSPITAL | Age: 85
Discharge: HOME | End: 2025-04-11
Attending: PHYSICIAN ASSISTANT
Payer: MEDICARE

## 2025-04-11 ENCOUNTER — OFFICE VISIT (OUTPATIENT)
Dept: INTERNAL MEDICINE | Facility: CLINIC | Age: 85
End: 2025-04-11
Payer: MEDICARE

## 2025-04-11 VITALS
DIASTOLIC BLOOD PRESSURE: 64 MMHG | HEART RATE: 69 BPM | BODY MASS INDEX: 29.6 KG/M2 | SYSTOLIC BLOOD PRESSURE: 112 MMHG | HEIGHT: 71 IN | WEIGHT: 211.44 LBS | OXYGEN SATURATION: 98 %

## 2025-04-11 DIAGNOSIS — G89.29 CHRONIC PAIN OF LEFT KNEE: ICD-10-CM

## 2025-04-11 DIAGNOSIS — M25.511 ACUTE PAIN OF RIGHT SHOULDER: ICD-10-CM

## 2025-04-11 DIAGNOSIS — M25.511 ACUTE PAIN OF RIGHT SHOULDER: Primary | ICD-10-CM

## 2025-04-11 DIAGNOSIS — M25.562 CHRONIC PAIN OF LEFT KNEE: ICD-10-CM

## 2025-04-11 PROBLEM — Z86.79 HISTORY OF ATRIAL FIBRILLATION: Status: ACTIVE | Noted: 2025-04-11

## 2025-04-11 PROBLEM — Z86.59 HISTORY OF PSYCHOSIS: Status: ACTIVE | Noted: 2022-11-15

## 2025-04-11 PROCEDURE — 73030 X-RAY EXAM OF SHOULDER: CPT | Mod: 26,RT,, | Performed by: RADIOLOGY

## 2025-04-11 PROCEDURE — 99215 OFFICE O/P EST HI 40 MIN: CPT | Mod: PBBFAC,25 | Performed by: PHYSICIAN ASSISTANT

## 2025-04-11 PROCEDURE — 73030 X-RAY EXAM OF SHOULDER: CPT | Mod: TC,RT

## 2025-04-11 PROCEDURE — 99999 PR PBB SHADOW E&M-EST. PATIENT-LVL V: CPT | Mod: PBBFAC,,, | Performed by: PHYSICIAN ASSISTANT

## 2025-04-11 RX ORDER — MELOXICAM 7.5 MG/1
7.5 TABLET ORAL DAILY PRN
Qty: 30 TABLET | Refills: 0 | Status: SHIPPED | OUTPATIENT
Start: 2025-04-11

## 2025-04-11 NOTE — PATIENT INSTRUCTIONS
Ice    Anti-inflammatory daily as needed, Rx sent to pharmacy    Handout stretching    We will message/call about xray results

## 2025-04-11 NOTE — PROGRESS NOTES
"Subjective     Patient ID: Yao Burroughs is a 84 y.o. male. With  has a past medical history of Anxiety disorder, unspecified, Auditory hallucinations, Depression, and Psychosis.      Chief Complaint: Shoulder Pain    HPI    Established pt of Randall Aleman MD (new to me)      Here with concerns of right shoulder pain for the past 1-2 months, Worse at night, positional/movements. Right handed. No injury or activity change. No numbness/tingling    Occ left knee pain, chronic, recurrent but mild. Xray last year revealed mild OA    No joint swelling      Past Medical History:   Diagnosis Date    Anxiety disorder, unspecified     Auditory hallucinations     Depression     Psychosis        Social History[1]    Review of patient's allergies indicates:   Allergen Reactions    Bee sting [allergen ext-venom-honey bee]        Review of Systems   Constitutional:  Negative for chills and fever.   Cardiovascular:  Negative for chest pain.   Musculoskeletal:  Positive for arthralgias. Negative for back pain and joint swelling.   Integumentary:  Negative for rash.   Neurological:  Negative for weakness and numbness.            Objective  /64 (BP Location: Left arm, Patient Position: Sitting)   Pulse 69   Ht 5' 11" (1.803 m)   Wt 95.9 kg (211 lb 6.7 oz)   SpO2 98%   BMI 29.49 kg/m²       Physical Exam  Constitutional:       General: He is not in acute distress.  Cardiovascular:      Rate and Rhythm: Normal rate and regular rhythm.   Pulmonary:      Effort: Pulmonary effort is normal. No respiratory distress.   Musculoskeletal:      Right shoulder: Tenderness (posterior shoulder, right trapezius) present. No bony tenderness. Decreased range of motion (internal rotation and abduction). Normal strength.      Comments: +Arc test  +empty Can   Skin:     Findings: No rash.   Neurological:      Mental Status: He is alert.   Psychiatric:         Mood and Affect: Mood normal.            Assessment and Plan     1. Acute pain " of right shoulder  -     X-ray Shoulder 2 or More Views Right; Future; Expected date: 04/11/2025  -     meloxicam (MOBIC) 7.5 MG tablet; Take 1 tablet (7.5 mg total) by mouth daily as needed for Pain.  Dispense: 30 tablet; Refill: 0    2. Chronic pain of left knee  -     meloxicam (MOBIC) 7.5 MG tablet; Take 1 tablet (7.5 mg total) by mouth daily as needed for Pain.  Dispense: 30 tablet; Refill: 0      Trial of mobic and ice prn  Discussed physical therapy, pt declines at this time  Handout on exercises/streching provided  Notify clinic if symptoms worsen or fail to resolve.     Ailyn Larson PA-C       [1]   Social History  Tobacco Use    Smoking status: Former     Types: Cigarettes     Passive exposure: Past    Smokeless tobacco: Never

## 2025-04-14 ENCOUNTER — RESULTS FOLLOW-UP (OUTPATIENT)
Dept: INTERNAL MEDICINE | Facility: CLINIC | Age: 85
End: 2025-04-14

## 2025-04-25 DIAGNOSIS — M25.511 ACUTE PAIN OF RIGHT SHOULDER: ICD-10-CM

## 2025-04-25 DIAGNOSIS — M25.562 CHRONIC PAIN OF LEFT KNEE: ICD-10-CM

## 2025-04-25 DIAGNOSIS — G89.29 CHRONIC PAIN OF LEFT KNEE: ICD-10-CM

## 2025-04-25 NOTE — TELEPHONE ENCOUNTER
No care due was identified.  [facility] Embedded Care Due Messages. Reference number: [id number].   4/25/2025 11:47:25 AM CDT

## 2025-04-27 RX ORDER — MELOXICAM 7.5 MG/1
7.5 TABLET ORAL DAILY PRN
Qty: 30 TABLET | Refills: 0 | Status: SHIPPED | OUTPATIENT
Start: 2025-04-27

## 2025-04-30 DIAGNOSIS — G89.29 CHRONIC PAIN OF LEFT KNEE: ICD-10-CM

## 2025-04-30 DIAGNOSIS — M25.511 ACUTE PAIN OF RIGHT SHOULDER: ICD-10-CM

## 2025-04-30 DIAGNOSIS — M25.562 CHRONIC PAIN OF LEFT KNEE: ICD-10-CM

## 2025-04-30 RX ORDER — MELOXICAM 7.5 MG/1
7.5 TABLET ORAL DAILY PRN
Qty: 30 TABLET | Refills: 0 | OUTPATIENT
Start: 2025-04-30

## 2025-04-30 NOTE — TELEPHONE ENCOUNTER
Refill Decision Note   Yao Burroughs  is requesting a refill authorization.  Brief Assessment and Rationale for Refill:  Quick Discontinue     Medication Therapy Plan:         Comments:     Note composed:9:39 AM 04/30/2025             Appointments     Last Visit   12/30/2024 Randall Aleman MD   Next Visit   Visit date not found Randall Aleman MD       Intact

## 2025-04-30 NOTE — TELEPHONE ENCOUNTER
No care due was identified.  Health [facility] Embedded Care Due Messages. Reference number: [id number].   4/30/2025 8:46:10 AM CDT

## 2025-06-10 DIAGNOSIS — G89.29 CHRONIC PAIN OF LEFT KNEE: ICD-10-CM

## 2025-06-10 DIAGNOSIS — M25.562 CHRONIC PAIN OF LEFT KNEE: ICD-10-CM

## 2025-06-10 DIAGNOSIS — M25.511 ACUTE PAIN OF RIGHT SHOULDER: ICD-10-CM

## 2025-06-10 RX ORDER — MELOXICAM 7.5 MG/1
7.5 TABLET ORAL DAILY PRN
Qty: 30 TABLET | Refills: 0 | Status: SHIPPED | OUTPATIENT
Start: 2025-06-10

## 2025-07-24 DIAGNOSIS — M25.511 ACUTE PAIN OF RIGHT SHOULDER: ICD-10-CM

## 2025-07-24 DIAGNOSIS — M25.562 CHRONIC PAIN OF LEFT KNEE: ICD-10-CM

## 2025-07-24 DIAGNOSIS — G89.29 CHRONIC PAIN OF LEFT KNEE: ICD-10-CM

## 2025-07-24 RX ORDER — MELOXICAM 7.5 MG/1
7.5 TABLET ORAL DAILY PRN
Qty: 30 TABLET | Refills: 0 | Status: SHIPPED | OUTPATIENT
Start: 2025-07-24

## 2025-07-24 NOTE — TELEPHONE ENCOUNTER
Refill Routing Note   Medication(s) are not appropriate for processing by [facility] for the following reason(s):        Outside of protocol    [facility] action(s):  Route             Appointments  past 12m or future 3m with PCP    Date Provider   Last Visit   12/30/2024 Randall Aleman MD   Next Visit   12/30/2025 Randall Aleman MD   ED visits in past 90 days: 0        Note composed:12:27 PM 07/24/2025

## 2025-07-24 NOTE — TELEPHONE ENCOUNTER
No care due was identified.  [facility] Embedded Care Due Messages. Reference number: [id number].   7/24/2025 10:53:28 AM CDT